# Patient Record
Sex: FEMALE | Race: WHITE | Employment: UNEMPLOYED | URBAN - METROPOLITAN AREA
[De-identification: names, ages, dates, MRNs, and addresses within clinical notes are randomized per-mention and may not be internally consistent; named-entity substitution may affect disease eponyms.]

---

## 2021-06-17 ENCOUNTER — HOSPITAL ENCOUNTER (OUTPATIENT)
Dept: MRI IMAGING | Age: 46
Discharge: HOME | End: 2021-06-17
Payer: COMMERCIAL

## 2021-06-17 VITALS — BODY MASS INDEX: 23.04 KG/M2 | HEIGHT: 63 IN | WEIGHT: 130 LBS

## 2021-06-17 DIAGNOSIS — C50.411 MALIGNANT NEOPLASM OF UPPER-OUTER QUADRANT OF RIGHT FEMALE BREAST, UNSPECIFIED ESTROGEN RECEPTOR STATUS (HCC): ICD-10-CM

## 2021-06-17 PROCEDURE — 77049 MRI BREAST C-+ W/CAD BI: CPT

## 2021-06-17 PROCEDURE — 6360000004 HC RX CONTRAST MEDICATION: Performed by: SURGERY

## 2021-06-17 PROCEDURE — A9579 GAD-BASE MR CONTRAST NOS,1ML: HCPCS | Performed by: SURGERY

## 2021-06-17 RX ADMIN — GADOTERIDOL 12 ML: 279.3 INJECTION, SOLUTION INTRAVENOUS at 14:53

## 2021-06-24 ENCOUNTER — HOSPITAL ENCOUNTER (OUTPATIENT)
Dept: CT IMAGING | Age: 46
Discharge: HOME | End: 2021-06-24
Payer: COMMERCIAL

## 2021-06-24 ENCOUNTER — HOSPITAL ENCOUNTER (OUTPATIENT)
Dept: NUCLEAR MEDICINE | Age: 46
Discharge: HOME | End: 2021-06-24
Payer: COMMERCIAL

## 2021-06-24 DIAGNOSIS — C50.411 MALIGNANT NEOPLASM OF UPPER-OUTER QUADRANT OF RIGHT FEMALE BREAST, UNSPECIFIED ESTROGEN RECEPTOR STATUS (HCC): ICD-10-CM

## 2021-06-24 PROCEDURE — 6360000004 HC RX CONTRAST MEDICATION: Performed by: SURGERY

## 2021-06-24 PROCEDURE — 74177 CT ABD & PELVIS W/CONTRAST: CPT

## 2021-06-24 PROCEDURE — A9503 TC99M MEDRONATE: HCPCS | Performed by: SURGERY

## 2021-06-24 PROCEDURE — 3430000000 HC RX DIAGNOSTIC RADIOPHARMACEUTICAL: Performed by: SURGERY

## 2021-06-24 PROCEDURE — 78306 BONE IMAGING WHOLE BODY: CPT

## 2021-06-24 RX ORDER — TC 99M MEDRONATE 20 MG/10ML
25 INJECTION, POWDER, LYOPHILIZED, FOR SOLUTION INTRAVENOUS
Status: COMPLETED | OUTPATIENT
Start: 2021-06-24 | End: 2021-06-24

## 2021-06-24 RX ADMIN — IOPAMIDOL 80 ML: 755 INJECTION, SOLUTION INTRAVENOUS at 12:33

## 2021-06-24 RX ADMIN — IOHEXOL 50 ML: 240 INJECTION, SOLUTION INTRATHECAL; INTRAVASCULAR; INTRAVENOUS; ORAL at 12:33

## 2021-06-24 RX ADMIN — TC 99M MEDRONATE 25 MILLICURIE: 20 INJECTION, POWDER, LYOPHILIZED, FOR SOLUTION INTRAVENOUS at 12:11

## 2022-06-08 ENCOUNTER — ANESTHESIA EVENT (OUTPATIENT)
Dept: OPERATING ROOM | Age: 47
DRG: 580 | End: 2022-06-08
Payer: COMMERCIAL

## 2022-06-08 RX ORDER — OXYBUTYNIN CHLORIDE 5 MG/1
5 TABLET, EXTENDED RELEASE ORAL EVERY EVENING
COMMUNITY
Start: 2022-04-06

## 2022-06-08 RX ORDER — ANASTROZOLE 1 MG/1
1 TABLET ORAL DAILY
COMMUNITY
Start: 2022-03-09

## 2022-06-08 RX ORDER — ALBUTEROL SULFATE 90 UG/1
2 AEROSOL, METERED RESPIRATORY (INHALATION) PRN
COMMUNITY
Start: 2021-06-30

## 2022-06-08 RX ORDER — DIAZEPAM 5 MG/1
TABLET ORAL PRN
COMMUNITY

## 2022-06-08 RX ORDER — SUMATRIPTAN 50 MG/1
50 TABLET, FILM COATED ORAL PRN
COMMUNITY
Start: 2021-11-22

## 2022-06-08 NOTE — PROGRESS NOTES
6/8 [facility] at Dr Salo Garza, aware no preop physical done and Dr Salo Garza will have to do dos-mp

## 2022-06-08 NOTE — PROGRESS NOTES
Place patient label inside box (if no patient label, complete below)  Name:  :  MR#:   Franklin Aquino / PROCEDURE  1. I (we), Corryguillermina Martinez (Patient Name) authorize Osiris Felix III (Provider / Usama Galeas) and/or such assistants as may be selected by him/her, to perform the following operation/procedure(s): RIGHT BREAST RECONSTRUCTION WITH LATISSIMUS DORSI FLAP AND INSERTION OF TISSUE EXPANDER ; ADJUST VOLUME TISSUE EXPANDER LEFT BREAST. Note: If unable to obtain consent prior to an emergent procedure, document the emergent reason in the medical record. This procedure has been explained to my (our) satisfaction and included in the explanation was:  A) The intended benefit, nature, and extent of the procedure to be performed;  B) The significant risks involved and the probability of success;  C) Alternative procedures and methods of treatment;  D) The dangers and probable consequences of such alternatives (including no procedure or treatment); E) The expected consequences of the procedure on my future health;  F) Whether other qualified individuals would be performing important surgical tasks and/or whether  would be present to advise or support the procedure. I (we) understand that there are other risks of infection and other serious complications in the pre-operative/procedural and postoperative/procedural stages of my (our) care. I (we) have asked all of the questions which I (we) thought were important in deciding whether or not to undergo treatment or diagnosis. These questions have been answered to my (our) satisfaction. I (we) understand that no assurance can be given that the procedure will be a success, and no guarantee or warranty of success has been given to me (us).     2. It has been explained to me (us) that during the course of the operation/procedure, unforeseen conditions may be revealed that necessitate extension of the original procedure(s) or different procedure(s) than those set forth in Paragraph 1. I (we) authorize and request that the above-named physician, his/her assistants or his/her designees, perform procedures as necessary and desirable if deemed to be in my (our) best interest.     Revised 8/2/2021                                                                          Page 1 of 2         3. I acknowledge that health care personnel may be observing this procedure for the purpose of medical education or other specified purposes as may be necessary as requested and/or approved by my (our) physician. 4. I (we) consent to the disposal by the hospital Pathologist of the removed tissue, parts or organs in accordance with hospital policy. 5. I do ____ do not ____ consent to the use of a local infiltration pain blocking agent that will be used by my provider/surgical provider to help alleviate pain during my procedure. 6. I do ____ do not ____ consent to an emergent blood transfusion in the case of a life-threatening situation that requires blood components to be administered. This consent is valid for 24 hours from the beginning of the procedure. 7. This patient does ____ or does not ____ currently have a DNR status/order. If DNR order is in place, obtain Addendum to the Surgical Consent for ALL Patients with a DNR Order to address jelly-operative status for limited intervention or DNR suspension.      8. I have read and fully understand the above Consent for Operation/Procedure and that all blanks were completed before I signed the consent.   _____________________________       _____________________      ____/____am/pm  Signature of Patient or legal representative      Printed Name / Relationship            Date / Time   ____________________________       _____________________      ____/____am/pm  Witness to Signature                                    Printed Name                    Date / Time     If patient is unable to sign or is a minor, complete the following)  Patient is a minor, ____ years of age, or unable to sign because:   ______________________________________________________________________________________________    [facility] If a phone consent is obtained, consent will be documented by using two health care professionals, each affirming that the consenting party has no questions and gives consent for the procedure discussed with the physician/provider.   _____________________          ____________________       _____/_____am/pm   2nd witness to phone consent        Printed name           Date / Time    Informed Consent:  I have provided the explanation described above in section 1 to the patient and/or legal representative.  I have provided the patient and/or legal representative with an opportunity to ask any questions about the proposed operation/procedure.   ___________________________          ____________________         ____/____am/pm  Provider / Proceduralist                            Printed name            Date / Time  Revised 8/2/2021                                                                      Page 2 of 2

## 2022-06-09 ENCOUNTER — ANESTHESIA (OUTPATIENT)
Dept: OPERATING ROOM | Age: 47
DRG: 580 | End: 2022-06-09
Payer: COMMERCIAL

## 2022-06-09 ENCOUNTER — HOSPITAL ENCOUNTER (INPATIENT)
Age: 47
LOS: 2 days | Discharge: HOME | DRG: 580 | End: 2022-06-12
Attending: PLASTIC SURGERY | Admitting: PLASTIC SURGERY
Payer: COMMERCIAL

## 2022-06-09 ENCOUNTER — ANESTHESIA EVENT (OUTPATIENT)
Dept: OPERATING ROOM | Age: 47
DRG: 580 | End: 2022-06-09
Payer: COMMERCIAL

## 2022-06-09 PROBLEM — Z90.13 ABSENCE OF BOTH BREASTS: Status: ACTIVE | Noted: 2022-06-09

## 2022-06-09 PROBLEM — Z98.890: Status: ACTIVE | Noted: 2022-06-09

## 2022-06-09 LAB
ABO/RH: NORMAL
ALBUMIN SERPL-MCNC: 3.3 G/DL (ref 3.4–5)
ANION GAP SERPL CALCULATED.3IONS-SCNC: 11 MMOL/L (ref 3–16)
ANTIBODY SCREEN: NORMAL
BASOPHILS ABSOLUTE: 0 K/UL (ref 0–0.2)
BASOPHILS RELATIVE PERCENT: 0.2 %
BUN BLDV-MCNC: 11 MG/DL (ref 7–20)
CALCIUM SERPL-MCNC: 8.7 MG/DL (ref 8.3–10.6)
CHLORIDE BLD-SCNC: 103 MMOL/L (ref 99–110)
CO2: 21 MMOL/L (ref 21–32)
CREAT SERPL-MCNC: 0.5 MG/DL (ref 0.6–1.1)
EOSINOPHILS ABSOLUTE: 0 K/UL (ref 0–0.6)
EOSINOPHILS RELATIVE PERCENT: 0 %
GFR AFRICAN AMERICAN: >60
GFR NON-AFRICAN AMERICAN: >60
GLUCOSE BLD-MCNC: 162 MG/DL (ref 70–99)
HCT VFR BLD CALC: 24.5 % (ref 36–48)
HCT VFR BLD CALC: 30 % (ref 36–48)
HEMOGLOBIN: 8 G/DL (ref 12–16)
HEMOGLOBIN: 9.8 G/DL (ref 12–16)
LYMPHOCYTES ABSOLUTE: 0.4 K/UL (ref 1–5.1)
LYMPHOCYTES RELATIVE PERCENT: 2.8 %
MCH RBC QN AUTO: 26.4 PG (ref 26–34)
MCH RBC QN AUTO: 26.5 PG (ref 26–34)
MCHC RBC AUTO-ENTMCNC: 32.4 G/DL (ref 31–36)
MCHC RBC AUTO-ENTMCNC: 32.8 G/DL (ref 31–36)
MCV RBC AUTO: 80.8 FL (ref 80–100)
MCV RBC AUTO: 81.5 FL (ref 80–100)
MONOCYTES ABSOLUTE: 0.4 K/UL (ref 0–1.3)
MONOCYTES RELATIVE PERCENT: 3.1 %
NEUTROPHILS ABSOLUTE: 12.3 K/UL (ref 1.7–7.7)
NEUTROPHILS RELATIVE PERCENT: 93.9 %
PDW BLD-RTO: 14.7 % (ref 12.4–15.4)
PDW BLD-RTO: 14.8 % (ref 12.4–15.4)
PHOSPHORUS: 4.8 MG/DL (ref 2.5–4.9)
PLATELET # BLD: 283 K/UL (ref 135–450)
PLATELET # BLD: 292 K/UL (ref 135–450)
PMV BLD AUTO: 6.8 FL (ref 5–10.5)
PMV BLD AUTO: 6.9 FL (ref 5–10.5)
POTASSIUM SERPL-SCNC: 4.1 MMOL/L (ref 3.5–5.1)
PREGNANCY, URINE: NEGATIVE
RBC # BLD: 3.01 M/UL (ref 4–5.2)
RBC # BLD: 3.71 M/UL (ref 4–5.2)
SODIUM BLD-SCNC: 135 MMOL/L (ref 136–145)
WBC # BLD: 11.5 K/UL (ref 4–11)
WBC # BLD: 13.1 K/UL (ref 4–11)

## 2022-06-09 PROCEDURE — 2709999900 HC NON-CHARGEABLE SUPPLY: Performed by: PLASTIC SURGERY

## 2022-06-09 PROCEDURE — 7100000001 HC PACU RECOVERY - ADDTL 15 MIN: Performed by: PLASTIC SURGERY

## 2022-06-09 PROCEDURE — 6360000002 HC RX W HCPCS: Performed by: ANESTHESIOLOGY

## 2022-06-09 PROCEDURE — 2580000003 HC RX 258: Performed by: PLASTIC SURGERY

## 2022-06-09 PROCEDURE — 2580000003 HC RX 258: Performed by: ANESTHESIOLOGY

## 2022-06-09 PROCEDURE — 80069 RENAL FUNCTION PANEL: CPT

## 2022-06-09 PROCEDURE — 85025 COMPLETE CBC W/AUTO DIFF WBC: CPT

## 2022-06-09 PROCEDURE — 2500000003 HC RX 250 WO HCPCS: Performed by: ANESTHESIOLOGY

## 2022-06-09 PROCEDURE — 3700000000 HC ANESTHESIA ATTENDED CARE: Performed by: PLASTIC SURGERY

## 2022-06-09 PROCEDURE — 0HHT0NZ INSERTION OF TISSUE EXPANDER INTO RIGHT BREAST, OPEN APPROACH: ICD-10-PCS | Performed by: PLASTIC SURGERY

## 2022-06-09 PROCEDURE — 6360000002 HC RX W HCPCS: Performed by: INTERNAL MEDICINE

## 2022-06-09 PROCEDURE — 3600000004 HC SURGERY LEVEL 4 BASE: Performed by: PLASTIC SURGERY

## 2022-06-09 PROCEDURE — 2580000003 HC RX 258: Performed by: INTERNAL MEDICINE

## 2022-06-09 PROCEDURE — G0378 HOSPITAL OBSERVATION PER HR: HCPCS

## 2022-06-09 PROCEDURE — 86900 BLOOD TYPING SEROLOGIC ABO: CPT

## 2022-06-09 PROCEDURE — 6370000000 HC RX 637 (ALT 250 FOR IP): Performed by: ANESTHESIOLOGY

## 2022-06-09 PROCEDURE — C2626 INFUSION PUMP, NON-PROG,TEMP: HCPCS | Performed by: PLASTIC SURGERY

## 2022-06-09 PROCEDURE — 2500000003 HC RX 250 WO HCPCS: Performed by: NURSE ANESTHETIST, CERTIFIED REGISTERED

## 2022-06-09 PROCEDURE — 36415 COLL VENOUS BLD VENIPUNCTURE: CPT

## 2022-06-09 PROCEDURE — 2500000003 HC RX 250 WO HCPCS: Performed by: PLASTIC SURGERY

## 2022-06-09 PROCEDURE — 86923 COMPATIBILITY TEST ELECTRIC: CPT

## 2022-06-09 PROCEDURE — C1889 IMPLANT/INSERT DEVICE, NOC: HCPCS | Performed by: PLASTIC SURGERY

## 2022-06-09 PROCEDURE — 6360000002 HC RX W HCPCS: Performed by: PLASTIC SURGERY

## 2022-06-09 PROCEDURE — 86850 RBC ANTIBODY SCREEN: CPT

## 2022-06-09 PROCEDURE — 6360000002 HC RX W HCPCS: Performed by: NURSE ANESTHETIST, CERTIFIED REGISTERED

## 2022-06-09 PROCEDURE — 3600000014 HC SURGERY LEVEL 4 ADDTL 15MIN: Performed by: PLASTIC SURGERY

## 2022-06-09 PROCEDURE — 0KXF0Z5 TRANSFER RIGHT TRUNK MUSCLE, LATISSIMUS DORSI MYOCUTANEOUS FLAP, OPEN APPROACH: ICD-10-PCS | Performed by: PLASTIC SURGERY

## 2022-06-09 PROCEDURE — 84703 CHORIONIC GONADOTROPIN ASSAY: CPT

## 2022-06-09 PROCEDURE — 85027 COMPLETE CBC AUTOMATED: CPT

## 2022-06-09 PROCEDURE — 86901 BLOOD TYPING SEROLOGIC RH(D): CPT

## 2022-06-09 PROCEDURE — 7100000000 HC PACU RECOVERY - FIRST 15 MIN: Performed by: PLASTIC SURGERY

## 2022-06-09 PROCEDURE — 0HWU0NZ REVISION OF TISSUE EXPANDER IN LEFT BREAST, OPEN APPROACH: ICD-10-PCS | Performed by: PLASTIC SURGERY

## 2022-06-09 PROCEDURE — 0W3K0ZZ CONTROL BLEEDING IN UPPER BACK, OPEN APPROACH: ICD-10-PCS | Performed by: PLASTIC SURGERY

## 2022-06-09 PROCEDURE — 3700000001 HC ADD 15 MINUTES (ANESTHESIA): Performed by: PLASTIC SURGERY

## 2022-06-09 RX ORDER — SODIUM CHLORIDE 0.9 % (FLUSH) 0.9 %
5-40 SYRINGE (ML) INJECTION PRN
Status: DISCONTINUED | OUTPATIENT
Start: 2022-06-09 | End: 2022-06-09 | Stop reason: HOSPADM

## 2022-06-09 RX ORDER — ONDANSETRON 2 MG/ML
4 INJECTION INTRAMUSCULAR; INTRAVENOUS
Status: DISCONTINUED | OUTPATIENT
Start: 2022-06-09 | End: 2022-06-09 | Stop reason: HOSPADM

## 2022-06-09 RX ORDER — SODIUM CHLORIDE 9 MG/ML
INJECTION, SOLUTION INTRAVENOUS PRN
Status: DISCONTINUED | OUTPATIENT
Start: 2022-06-09 | End: 2022-06-09 | Stop reason: HOSPADM

## 2022-06-09 RX ORDER — MEPERIDINE HYDROCHLORIDE 25 MG/ML
12.5 INJECTION INTRAMUSCULAR; INTRAVENOUS; SUBCUTANEOUS EVERY 5 MIN PRN
Status: DISCONTINUED | OUTPATIENT
Start: 2022-06-09 | End: 2022-06-12 | Stop reason: HOSPADM

## 2022-06-09 RX ORDER — SODIUM CHLORIDE, SODIUM LACTATE, POTASSIUM CHLORIDE, CALCIUM CHLORIDE 600; 310; 30; 20 MG/100ML; MG/100ML; MG/100ML; MG/100ML
INJECTION, SOLUTION INTRAVENOUS CONTINUOUS
Status: DISCONTINUED | OUTPATIENT
Start: 2022-06-09 | End: 2022-06-09 | Stop reason: HOSPADM

## 2022-06-09 RX ORDER — OXYCODONE HYDROCHLORIDE AND ACETAMINOPHEN 5; 325 MG/1; MG/1
1 TABLET ORAL EVERY 4 HOURS PRN
Status: DISCONTINUED | OUTPATIENT
Start: 2022-06-09 | End: 2022-06-09

## 2022-06-09 RX ORDER — SODIUM CHLORIDE AND POTASSIUM CHLORIDE .9; .15 G/100ML; G/100ML
SOLUTION INTRAVENOUS CONTINUOUS
Status: DISCONTINUED | OUTPATIENT
Start: 2022-06-09 | End: 2022-06-09

## 2022-06-09 RX ORDER — SUCCINYLCHOLINE CHLORIDE 20 MG/ML
INJECTION INTRAMUSCULAR; INTRAVENOUS PRN
Status: DISCONTINUED | OUTPATIENT
Start: 2022-06-09 | End: 2022-06-09 | Stop reason: SDUPTHER

## 2022-06-09 RX ORDER — SODIUM CHLORIDE 0.9 % (FLUSH) 0.9 %
5-40 SYRINGE (ML) INJECTION EVERY 12 HOURS SCHEDULED
Status: DISCONTINUED | OUTPATIENT
Start: 2022-06-09 | End: 2022-06-12 | Stop reason: HOSPADM

## 2022-06-09 RX ORDER — DIPHENHYDRAMINE HYDROCHLORIDE 50 MG/ML
12.5 INJECTION INTRAMUSCULAR; INTRAVENOUS
Status: DISCONTINUED | OUTPATIENT
Start: 2022-06-09 | End: 2022-06-09 | Stop reason: HOSPADM

## 2022-06-09 RX ORDER — SODIUM CHLORIDE 0.9 % (FLUSH) 0.9 %
5-40 SYRINGE (ML) INJECTION PRN
Status: DISCONTINUED | OUTPATIENT
Start: 2022-06-09 | End: 2022-06-12 | Stop reason: HOSPADM

## 2022-06-09 RX ORDER — ONDANSETRON 2 MG/ML
4 INJECTION INTRAMUSCULAR; INTRAVENOUS EVERY 6 HOURS PRN
Status: DISCONTINUED | OUTPATIENT
Start: 2022-06-09 | End: 2022-06-09

## 2022-06-09 RX ORDER — ROCURONIUM BROMIDE 10 MG/ML
INJECTION, SOLUTION INTRAVENOUS PRN
Status: DISCONTINUED | OUTPATIENT
Start: 2022-06-09 | End: 2022-06-09 | Stop reason: SDUPTHER

## 2022-06-09 RX ORDER — FENTANYL CITRATE 50 UG/ML
INJECTION, SOLUTION INTRAMUSCULAR; INTRAVENOUS PRN
Status: DISCONTINUED | OUTPATIENT
Start: 2022-06-09 | End: 2022-06-09

## 2022-06-09 RX ORDER — 0.9 % SODIUM CHLORIDE 0.9 %
1000 INTRAVENOUS SOLUTION INTRAVENOUS ONCE
Status: COMPLETED | OUTPATIENT
Start: 2022-06-09 | End: 2022-06-09

## 2022-06-09 RX ORDER — OXYCODONE HYDROCHLORIDE 5 MG/1
5 TABLET ORAL PRN
Status: ACTIVE | OUTPATIENT
Start: 2022-06-09 | End: 2022-06-09

## 2022-06-09 RX ORDER — PROCHLORPERAZINE EDISYLATE 5 MG/ML
10 INJECTION INTRAMUSCULAR; INTRAVENOUS EVERY 6 HOURS PRN
Status: DISCONTINUED | OUTPATIENT
Start: 2022-06-09 | End: 2022-06-12 | Stop reason: HOSPADM

## 2022-06-09 RX ORDER — PROCHLORPERAZINE EDISYLATE 5 MG/ML
5 INJECTION INTRAMUSCULAR; INTRAVENOUS
Status: DISCONTINUED | OUTPATIENT
Start: 2022-06-09 | End: 2022-06-09 | Stop reason: HOSPADM

## 2022-06-09 RX ORDER — HYDRALAZINE HYDROCHLORIDE 20 MG/ML
10 INJECTION INTRAMUSCULAR; INTRAVENOUS
Status: DISCONTINUED | OUTPATIENT
Start: 2022-06-09 | End: 2022-06-12 | Stop reason: HOSPADM

## 2022-06-09 RX ORDER — ONDANSETRON 2 MG/ML
INJECTION INTRAMUSCULAR; INTRAVENOUS PRN
Status: DISCONTINUED | OUTPATIENT
Start: 2022-06-09 | End: 2022-06-09

## 2022-06-09 RX ORDER — SODIUM CHLORIDE 0.9 % (FLUSH) 0.9 %
5-40 SYRINGE (ML) INJECTION EVERY 12 HOURS SCHEDULED
Status: DISCONTINUED | OUTPATIENT
Start: 2022-06-09 | End: 2022-06-09 | Stop reason: HOSPADM

## 2022-06-09 RX ORDER — SCOLOPAMINE TRANSDERMAL SYSTEM 1 MG/1
1 PATCH, EXTENDED RELEASE TRANSDERMAL
Status: DISCONTINUED | OUTPATIENT
Start: 2022-06-09 | End: 2022-06-12 | Stop reason: HOSPADM

## 2022-06-09 RX ORDER — MIDAZOLAM HYDROCHLORIDE 1 MG/ML
INJECTION INTRAMUSCULAR; INTRAVENOUS PRN
Status: DISCONTINUED | OUTPATIENT
Start: 2022-06-09 | End: 2022-06-09 | Stop reason: SDUPTHER

## 2022-06-09 RX ORDER — MORPHINE SULFATE 10 MG/ML
INJECTION, SOLUTION INTRAMUSCULAR; INTRAVENOUS PRN
Status: DISCONTINUED | OUTPATIENT
Start: 2022-06-09 | End: 2022-06-09

## 2022-06-09 RX ORDER — HYDROMORPHONE HCL 110MG/55ML
PATIENT CONTROLLED ANALGESIA SYRINGE INTRAVENOUS PRN
Status: DISCONTINUED | OUTPATIENT
Start: 2022-06-09 | End: 2022-06-09

## 2022-06-09 RX ORDER — SODIUM CHLORIDE 9 MG/ML
25 INJECTION, SOLUTION INTRAVENOUS PRN
Status: DISCONTINUED | OUTPATIENT
Start: 2022-06-09 | End: 2022-06-12 | Stop reason: HOSPADM

## 2022-06-09 RX ORDER — CLINDAMYCIN PHOSPHATE 900 MG/50ML
INJECTION INTRAVENOUS PRN
Status: DISCONTINUED | OUTPATIENT
Start: 2022-06-09 | End: 2022-06-09 | Stop reason: SDUPTHER

## 2022-06-09 RX ORDER — MEPERIDINE HYDROCHLORIDE 25 MG/ML
12.5 INJECTION INTRAMUSCULAR; INTRAVENOUS; SUBCUTANEOUS PRN
Status: DISCONTINUED | OUTPATIENT
Start: 2022-06-09 | End: 2022-06-09 | Stop reason: HOSPADM

## 2022-06-09 RX ORDER — MORPHINE SULFATE 4 MG/ML
2 INJECTION, SOLUTION INTRAMUSCULAR; INTRAVENOUS EVERY 4 HOURS PRN
Status: DISCONTINUED | OUTPATIENT
Start: 2022-06-09 | End: 2022-06-09

## 2022-06-09 RX ORDER — PROPOFOL 10 MG/ML
INJECTION, EMULSION INTRAVENOUS PRN
Status: DISCONTINUED | OUTPATIENT
Start: 2022-06-09 | End: 2022-06-09 | Stop reason: SDUPTHER

## 2022-06-09 RX ORDER — SODIUM CHLORIDE 9 MG/ML
INJECTION, SOLUTION INTRAVENOUS PRN
Status: DISCONTINUED | OUTPATIENT
Start: 2022-06-09 | End: 2022-06-10

## 2022-06-09 RX ORDER — SODIUM CHLORIDE 9 MG/ML
INJECTION, SOLUTION INTRAVENOUS PRN
Status: DISCONTINUED | OUTPATIENT
Start: 2022-06-09 | End: 2022-06-12 | Stop reason: HOSPADM

## 2022-06-09 RX ORDER — OXYCODONE HYDROCHLORIDE 5 MG/1
10 TABLET ORAL PRN
Status: ACTIVE | OUTPATIENT
Start: 2022-06-09 | End: 2022-06-09

## 2022-06-09 RX ORDER — DIPHENHYDRAMINE HYDROCHLORIDE 50 MG/ML
12.5 INJECTION INTRAMUSCULAR; INTRAVENOUS
Status: ACTIVE | OUTPATIENT
Start: 2022-06-09 | End: 2022-06-09

## 2022-06-09 RX ORDER — 0.9 % SODIUM CHLORIDE 0.9 %
1000 INTRAVENOUS SOLUTION INTRAVENOUS ONCE
Status: DISCONTINUED | OUTPATIENT
Start: 2022-06-09 | End: 2022-06-12 | Stop reason: HOSPADM

## 2022-06-09 RX ORDER — METOCLOPRAMIDE HYDROCHLORIDE 5 MG/ML
10 INJECTION INTRAMUSCULAR; INTRAVENOUS
Status: ACTIVE | OUTPATIENT
Start: 2022-06-09 | End: 2022-06-09

## 2022-06-09 RX ORDER — HYDRALAZINE HYDROCHLORIDE 20 MG/ML
10 INJECTION INTRAMUSCULAR; INTRAVENOUS
Status: DISCONTINUED | OUTPATIENT
Start: 2022-06-09 | End: 2022-06-09 | Stop reason: HOSPADM

## 2022-06-09 RX ORDER — LABETALOL HYDROCHLORIDE 5 MG/ML
10 INJECTION, SOLUTION INTRAVENOUS
Status: DISCONTINUED | OUTPATIENT
Start: 2022-06-09 | End: 2022-06-12 | Stop reason: HOSPADM

## 2022-06-09 RX ORDER — SODIUM CHLORIDE 9 MG/ML
INJECTION, SOLUTION INTRAVENOUS CONTINUOUS PRN
Status: DISCONTINUED | OUTPATIENT
Start: 2022-06-09 | End: 2022-06-09 | Stop reason: SDUPTHER

## 2022-06-09 RX ORDER — LIDOCAINE HYDROCHLORIDE 20 MG/ML
INJECTION, SOLUTION INTRAVENOUS PRN
Status: DISCONTINUED | OUTPATIENT
Start: 2022-06-09 | End: 2022-06-09 | Stop reason: SDUPTHER

## 2022-06-09 RX ORDER — ONDANSETRON 4 MG/1
4 TABLET, ORALLY DISINTEGRATING ORAL EVERY 8 HOURS PRN
Status: DISCONTINUED | OUTPATIENT
Start: 2022-06-09 | End: 2022-06-09

## 2022-06-09 RX ORDER — DEXAMETHASONE SODIUM PHOSPHATE 4 MG/ML
INJECTION, SOLUTION INTRA-ARTICULAR; INTRALESIONAL; INTRAMUSCULAR; INTRAVENOUS; SOFT TISSUE PRN
Status: DISCONTINUED | OUTPATIENT
Start: 2022-06-09 | End: 2022-06-09 | Stop reason: SDUPTHER

## 2022-06-09 RX ORDER — APREPITANT 40 MG/1
40 CAPSULE ORAL ONCE
Status: COMPLETED | OUTPATIENT
Start: 2022-06-09 | End: 2022-06-09

## 2022-06-09 RX ORDER — CLINDAMYCIN PHOSPHATE 900 MG/50ML
900 INJECTION INTRAVENOUS EVERY 8 HOURS
Status: DISCONTINUED | OUTPATIENT
Start: 2022-06-09 | End: 2022-06-09 | Stop reason: HOSPADM

## 2022-06-09 RX ADMIN — SODIUM CHLORIDE: 9 INJECTION, SOLUTION INTRAVENOUS at 22:04

## 2022-06-09 RX ADMIN — HYDROMORPHONE HYDROCHLORIDE 0.5 MG: 2 INJECTION, SOLUTION INTRAMUSCULAR; INTRAVENOUS; SUBCUTANEOUS at 16:27

## 2022-06-09 RX ADMIN — HYDROMORPHONE HYDROCHLORIDE 0.5 MG: 2 INJECTION, SOLUTION INTRAMUSCULAR; INTRAVENOUS; SUBCUTANEOUS at 16:12

## 2022-06-09 RX ADMIN — CLINDAMYCIN PHOSPHATE 900 MG: 900 INJECTION, SOLUTION INTRAVENOUS at 12:22

## 2022-06-09 RX ADMIN — APREPITANT 40 MG: 40 CAPSULE ORAL at 12:15

## 2022-06-09 RX ADMIN — LIDOCAINE HYDROCHLORIDE 100 MG: 20 INJECTION, SOLUTION INTRAVENOUS at 12:30

## 2022-06-09 RX ADMIN — ROCURONIUM BROMIDE 30 MG: 10 INJECTION INTRAVENOUS at 14:14

## 2022-06-09 RX ADMIN — MIDAZOLAM HYDROCHLORIDE 1 MG: 2 INJECTION, SOLUTION INTRAMUSCULAR; INTRAVENOUS at 12:23

## 2022-06-09 RX ADMIN — SODIUM CHLORIDE, POTASSIUM CHLORIDE, SODIUM LACTATE AND CALCIUM CHLORIDE: 600; 310; 30; 20 INJECTION, SOLUTION INTRAVENOUS at 13:50

## 2022-06-09 RX ADMIN — SODIUM CHLORIDE: 9 INJECTION, SOLUTION INTRAVENOUS at 21:51

## 2022-06-09 RX ADMIN — DEXAMETHASONE SODIUM PHOSPHATE 4 MG: 4 INJECTION, SOLUTION INTRAMUSCULAR; INTRAVENOUS at 14:20

## 2022-06-09 RX ADMIN — SODIUM CHLORIDE, PRESERVATIVE FREE 10 ML: 5 INJECTION INTRAVENOUS at 19:51

## 2022-06-09 RX ADMIN — MORPHINE SULFATE 2 MG: 10 INJECTION, SOLUTION INTRAMUSCULAR; INTRAVENOUS at 15:36

## 2022-06-09 RX ADMIN — CLINDAMYCIN PHOSPHATE 900 MG: 18 INJECTION, SOLUTION INTRAVENOUS at 22:14

## 2022-06-09 RX ADMIN — PROPOFOL 180 MG: 10 INJECTION, EMULSION INTRAVENOUS at 12:30

## 2022-06-09 RX ADMIN — MIDAZOLAM HYDROCHLORIDE 1 MG: 2 INJECTION, SOLUTION INTRAMUSCULAR; INTRAVENOUS at 12:22

## 2022-06-09 RX ADMIN — FENTANYL CITRATE 50 MCG: 50 INJECTION, SOLUTION INTRAMUSCULAR; INTRAVENOUS at 12:24

## 2022-06-09 RX ADMIN — POTASSIUM CHLORIDE AND SODIUM CHLORIDE: 900; 150 INJECTION, SOLUTION INTRAVENOUS at 19:57

## 2022-06-09 RX ADMIN — PROCHLORPERAZINE EDISYLATE 10 MG: 5 INJECTION, SOLUTION INTRAMUSCULAR; INTRAVENOUS at 19:50

## 2022-06-09 RX ADMIN — SODIUM CHLORIDE: 9 INJECTION, SOLUTION INTRAVENOUS at 23:09

## 2022-06-09 RX ADMIN — ROCURONIUM BROMIDE 50 MG: 10 INJECTION INTRAVENOUS at 13:09

## 2022-06-09 RX ADMIN — ROCURONIUM BROMIDE 50 MG: 10 INJECTION INTRAVENOUS at 12:30

## 2022-06-09 RX ADMIN — ONDANSETRON 4 MG: 2 INJECTION INTRAMUSCULAR; INTRAVENOUS at 16:51

## 2022-06-09 RX ADMIN — HYDROMORPHONE HYDROCHLORIDE 0.5 MG: 1 INJECTION, SOLUTION INTRAMUSCULAR; INTRAVENOUS; SUBCUTANEOUS at 17:42

## 2022-06-09 RX ADMIN — PHENYLEPHRINE HYDROCHLORIDE 200 MCG: 10 INJECTION, SOLUTION INTRAMUSCULAR; INTRAVENOUS; SUBCUTANEOUS at 22:13

## 2022-06-09 RX ADMIN — MEPERIDINE HYDROCHLORIDE 12.5 MG: 25 INJECTION INTRAMUSCULAR; INTRAVENOUS; SUBCUTANEOUS at 23:17

## 2022-06-09 RX ADMIN — FENTANYL CITRATE 50 MCG: 50 INJECTION, SOLUTION INTRAMUSCULAR; INTRAVENOUS at 12:22

## 2022-06-09 RX ADMIN — PROPOFOL 150 MG: 10 INJECTION, EMULSION INTRAVENOUS at 21:55

## 2022-06-09 RX ADMIN — PROPOFOL 20 MG: 10 INJECTION, EMULSION INTRAVENOUS at 16:00

## 2022-06-09 RX ADMIN — SODIUM CHLORIDE 1000 ML: 9 INJECTION, SOLUTION INTRAVENOUS at 20:37

## 2022-06-09 RX ADMIN — FENTANYL CITRATE 100 MCG: 50 INJECTION, SOLUTION INTRAMUSCULAR; INTRAVENOUS at 21:52

## 2022-06-09 RX ADMIN — HYDROMORPHONE HYDROCHLORIDE 0.5 MG: 1 INJECTION, SOLUTION INTRAMUSCULAR; INTRAVENOUS; SUBCUTANEOUS at 17:51

## 2022-06-09 RX ADMIN — MORPHINE SULFATE 4 MG: 10 INJECTION, SOLUTION INTRAMUSCULAR; INTRAVENOUS at 13:12

## 2022-06-09 RX ADMIN — ONDANSETRON 4 MG: 2 INJECTION INTRAMUSCULAR; INTRAVENOUS at 20:54

## 2022-06-09 RX ADMIN — SUCCINYLCHOLINE CHLORIDE 60 MG: 20 INJECTION, SOLUTION INTRAMUSCULAR; INTRAVENOUS; PARENTERAL at 21:55

## 2022-06-09 RX ADMIN — SODIUM CHLORIDE, POTASSIUM CHLORIDE, SODIUM LACTATE AND CALCIUM CHLORIDE: 600; 310; 30; 20 INJECTION, SOLUTION INTRAVENOUS at 11:51

## 2022-06-09 RX ADMIN — MORPHINE SULFATE 2 MG: 10 INJECTION, SOLUTION INTRAMUSCULAR; INTRAVENOUS at 13:02

## 2022-06-09 RX ADMIN — PHENYLEPHRINE HYDROCHLORIDE 200 MCG: 10 INJECTION, SOLUTION INTRAMUSCULAR; INTRAVENOUS; SUBCUTANEOUS at 22:37

## 2022-06-09 RX ADMIN — MORPHINE SULFATE 2 MG: 10 INJECTION, SOLUTION INTRAMUSCULAR; INTRAVENOUS at 15:40

## 2022-06-09 RX ADMIN — SODIUM CHLORIDE, POTASSIUM CHLORIDE, SODIUM LACTATE AND CALCIUM CHLORIDE: 600; 310; 30; 20 INJECTION, SOLUTION INTRAVENOUS at 16:09

## 2022-06-09 ASSESSMENT — PAIN SCALES - GENERAL
PAINLEVEL_OUTOF10: 0
PAINLEVEL_OUTOF10: 3
PAINLEVEL_OUTOF10: 7
PAINLEVEL_OUTOF10: 0
PAINLEVEL_OUTOF10: 0
PAINLEVEL_OUTOF10: 7

## 2022-06-09 ASSESSMENT — PAIN DESCRIPTION - FREQUENCY
FREQUENCY: CONTINUOUS
FREQUENCY: CONTINUOUS

## 2022-06-09 ASSESSMENT — PAIN DESCRIPTION - PAIN TYPE
TYPE: SURGICAL PAIN
TYPE: SURGICAL PAIN

## 2022-06-09 ASSESSMENT — LIFESTYLE VARIABLES: SMOKING_STATUS: 0

## 2022-06-09 ASSESSMENT — PAIN DESCRIPTION - ONSET
ONSET: ON-GOING
ONSET: ON-GOING

## 2022-06-09 ASSESSMENT — PAIN DESCRIPTION - DESCRIPTORS
DESCRIPTORS: ACHING
DESCRIPTORS: DISCOMFORT
DESCRIPTORS: ACHING

## 2022-06-09 ASSESSMENT — PAIN DESCRIPTION - ORIENTATION
ORIENTATION: RIGHT;LEFT
ORIENTATION: RIGHT;LEFT

## 2022-06-09 ASSESSMENT — PAIN DESCRIPTION - LOCATION
LOCATION: BREAST
LOCATION: BREAST

## 2022-06-09 ASSESSMENT — PAIN - FUNCTIONAL ASSESSMENT
PAIN_FUNCTIONAL_ASSESSMENT: 0-10
PAIN_FUNCTIONAL_ASSESSMENT: PREVENTS OR INTERFERES SOME ACTIVE ACTIVITIES AND ADLS

## 2022-06-09 NOTE — BRIEF OP NOTE
Brief Postoperative Note      Patient: Marco Antonio Galeas  YOB: 1975  MRN: [id number]    Date of Procedure: 6/9/2022    Pre-Op Diagnosis: Malignant neoplasm of right female breast, unspecified estrogen receptor status, unspecified site of breast ([facility] [C50.911]    Post-Op Diagnosis: Same       Procedure(s):  RIGHT BREAST RECONSTRUCTION WITH LATISSIMUS DORSI FLAP AND INSERTION OF TISSUE EXPANDER  .     Surgeon(s):  Yasmine Monk MD    Assistant:  Surgical Assistant: Ashley Willingham RN; Alda Torres RN    Anesthesia: General    Estimated Blood Loss (mL): less than 278     Complications: None    Specimens:   * No specimens in log *    Implants:  * No implants in log *      Drains:   Closed/Suction Drain Right Back Bulb (Active)   Site Description Clean, dry & intact 06/09/22 1513   Dressing Status New dressing applied 06/09/22 1513   Drain Status Compressed 06/09/22 1513       Closed/Suction Drain Lateral RUQ Bulb (Active)       Urinary Catheter 2 Way (Active)       Findings: n/a    Electronically signed by Yasmine Monk MD on 6/9/2022 at 5:01 PM

## 2022-06-09 NOTE — PROGRESS NOTES
Patient arrived to PACU post RIGHT BREAST RECONSTRUCTION WITH LATISSIMUS DORSI FLAP AND INSERTION OF TISSUE EXPANDER - Right with Dr. Rolando Fernandez. VSS on arrival. CRNA gave PACU RN report at bedside stating no complications during procedure. Surgical sites dry and intact. Patient shows no signs of pain at this time. Will continue to monitor.

## 2022-06-09 NOTE — ANESTHESIA PRE PROCEDURE
Department of Anesthesiology  Preprocedure Note       Name:  Rickie Healy   Age:  48 y.o.  :  1975                                          MRN:  [id number]         Date:  2022      Surgeon: Manuela Anderson):  Cherelle Angela MD    Procedure: Procedure(s):  RIGHT BREAST RECONSTRUCTION WITH LATISSIMUS DORSI FLAP AND INSERTION OF TISSUE EXPANDER  . Medications prior to admission:   Prior to Admission medications    Medication Sig Start Date End Date Taking? Authorizing Provider   oxybutynin (DITROPAN-XL) 5 MG extended release tablet Take 5 mg by mouth every evening 22  Yes Historical Provider, MD   SUMAtriptan (IMITREX) 50 MG tablet Take 50 mg by mouth as needed 21  Yes Historical Provider, MD   anastrozole (ARIMIDEX) 1 MG tablet Take 1 mg by mouth daily 3/9/22  Yes Historical Provider, MD   albuterol sulfate HFA (PROVENTIL;VENTOLIN;PROAIR) 108 (90 Base) MCG/ACT inhaler Inhale 2 puffs into the lungs as needed 21  Yes Historical Provider, MD   diazePAM (VALIUM) 5 MG tablet Take by mouth as needed. Historical Provider, MD       Current medications:    Current Facility-Administered Medications   Medication Dose Route Frequency Provider Last Rate Last Admin    lactated ringers infusion   IntraVENous Continuous Corinna Stevenson  mL/hr at 22 1200 NoRateChange at 22 1200    sodium chloride flush 0.9 % injection 5-40 mL  5-40 mL IntraVENous 2 times per day Corinna Stevenson MD        sodium chloride flush 0.9 % injection 5-40 mL  5-40 mL IntraVENous PRN Corinna Stevenson MD        0.9 % sodium chloride infusion   IntraVENous PRN Corinna Stevenson MD        clindamycin (CLEOCIN) 900 mg in dextrose 5 % 50 mL IVPB  900 mg IntraVENous 9500 Germán Blackburn III, MD           Allergies: Allergies   Allergen Reactions    Penicillins Hives and Rash       Problem List:  There is no problem list on file for this patient.       Past Medical History:        Diagnosis Date    Asthma     Cancer ([facility]     RENAY BREAST    History of radiation therapy        Past Surgical History:        Procedure Laterality Date    ABDOMEN SURGERY      \"tummy tuck\"     SECTION      MASTECTOMY, BILATERAL         Social History:    Social History     Tobacco Use    Smoking status: Never Smoker    Smokeless tobacco: Never Used   Substance Use Topics    Alcohol use: Yes                                Counseling given: Not Answered      Vital Signs (Current):   Vitals:    22 0838 22 1137   BP:  (!) 152/95   Pulse:  78   Resp:  13   Temp:  98.2 °F (36.8 °C)   TempSrc:  Temporal   SpO2:  97%   Weight: 130 lb (59 kg) 143 lb 3.2 oz (65 kg)   Height: 5' 3\" (1.6 m) 5' 3\" (1.6 m)                                              BP Readings from Last 3 Encounters:   22 (!) 152/95       NPO Status: Time of last liquid consumption: 1800                        Time of last solid consumption: 1800                        Date of last liquid consumption: 22                        Date of last solid food consumption: 22    BMI:   Wt Readings from Last 3 Encounters:   22 143 lb 3.2 oz (65 kg)   21 130 lb (59 kg)     Body mass index is 25.37 kg/m². CBC: No results found for: WBC, RBC, HGB, HCT, MCV, RDW, PLT    CMP: No results found for: NA, K, CL, CO2, BUN, CREATININE, GFRAA, AGRATIO, LABGLOM, GLUCOSE, GLU, PROT, CALCIUM, BILITOT, ALKPHOS, AST, ALT    POC Tests: No results for input(s): POCGLU, POCNA, POCK, POCCL, POCBUN, POCHEMO, POCHCT in the last 72 hours.     Coags: No results found for: PROTIME, INR, APTT    HCG (If Applicable):   Lab Results   Component Value Date    PREGTESTUR Negative 2022        ABGs: No results found for: PHART, PO2ART, QVQ9FQG, ORU9GML, BEART, E8WOPRPW     Type & Screen (If Applicable):  No results found for: LABABO, LABRH    Drug/Infectious Status (If Applicable):  No results found for: HIV, HEPCAB    COVID-19 Screening (If Applicable): No results found for: COVID19        Anesthesia Evaluation  Patient summary reviewed and Nursing notes reviewed no history of anesthetic complications:   Airway: Mallampati: II  TM distance: >3 FB   Neck ROM: full  Mouth opening: > = 3 FB   Dental: normal exam         Pulmonary: breath sounds clear to auscultation  (+) asthma:     (-) not a current smoker (never)                          ROS comment: covid 12/2020    Mild case    Cardiovascular:  Exercise tolerance: good (>4 METS),       (-) past MI    NYHA Classification: I    Rhythm: regular  Rate: normal           Beta Blocker:  Not on Beta Blocker         Neuro/Psych:               GI/Hepatic/Renal:        (-) GERD       Endo/Other: Negative Endo/Other ROS   (+) malignancy/cancer (mastectomy 8/2021    radiation tx only  ). Abdominal:             Vascular: Other Findings:           Anesthesia Plan      general     ASA 3       Induction: intravenous. MIPS: Prophylactic antiemetics administered. Anesthetic plan and risks discussed with patient and spouse. Plan discussed with CRNA.     Attending anesthesiologist reviewed and agrees with Preprocedure content                Maria E Almaguer DO   6/9/2022

## 2022-06-09 NOTE — H&P
[address] Same Day Surgery Update H & P  Department of General Surgery   Surgical Service   Pre-operative History and Physical  Last H & P within the last 30 days. DIAGNOSIS:   Malignant neoplasm of right female breast, unspecified estrogen receptor status, unspecified site of breast ([facility] [C50.911]    Procedure(s):  RIGHT BREAST RECONSTRUCTION WITH LATISSIMUS DORSI FLAP AND INSERTION OF TISSUE EXPANDER  . History obtained from: Patient interview and EHR      HISTORY OF PRESENT ILLNESS:   The patient is a [address] y.o. female with history of breast cancer presents today for the above procedure. Illness Screening: Patient denies fever, chills, worsening cough, or close contact with sick individuals. Past Medical History:        Diagnosis Date    Asthma     [facility]     RENAY BREAST    History of radiation therapy      Past Surgical History:        Procedure Laterality Date     SECTION      MASTECTOMY, BILATERAL         Medications Prior to Admission:      Prior to Admission medications    Medication Sig Start Date End Date Taking? Authorizing Provider   oxybutynin (DITROPAN-XL) 5 MG extended release tablet Take 5 mg by mouth every evening 22  Yes Historical Provider, MD   SUMAtriptan (IMITREX) 50 MG tablet Take 50 mg by mouth as needed 21  Yes Historical Provider, MD   anastrozole (ARIMIDEX) 1 MG tablet Take 1 mg by mouth daily 3/9/22  Yes Historical Provider, MD   albuterol sulfate HFA (PROVENTIL;VENTOLIN;PROAIR) 108 (90 Base) MCG/ACT inhaler Inhale 2 puffs into the lungs as needed 21  Yes Historical Provider, MD   diazePAM (VALIUM) 5 MG tablet Take by mouth as needed.     Historical Provider, MD         Allergies:  Penicillins    PHYSICAL EXAM:      BP (!) 152/95   Pulse 78   Temp 98.2 °F (36.8 °C) (Temporal)   Resp 13   Ht 5' 3\" (1.6 m)   Wt 143 lb 3.2 oz (65 kg)   SpO2 97%   BMI 25.37 kg/m²      Airway:  Airway patent with no audible stridor    Heart:  Regular rate and rhythm, No murmur noted    Lungs:  No increased work of breathing, good air exchange, clear to auscultation bilaterally, no crackles or wheezing    Abdomen:  Soft, non-distended, non-tender, no rebound tenderness or guarding, and no masses palpated    ASSESSMENT AND PLAN     Patient is a 48 y.o. female with above specified procedure planned. 1.  The patients history and physical was obtained and signed off by the pre-admission testing department. Patient seen and focused exam done today- no new changes since last physical exam on 6/8/22    2. Access to ancillary services are available per request of the provider. 3.  Of note the patient has c/o left tenderness and deformity of her expander. She will discuss that with Dr. Anselmo Steele.       Ana Lopez, NIKOLAY - CNP     6/9/2022

## 2022-06-09 NOTE — PROGRESS NOTES
Patient seen at the bedside per request by Dr. Rolando Fernandez  Has returned from PACU around 6 PM, status post right breast reconstruction with latissimus dorsal flap and insertion of tissue expander  Patient has been complaining of nausea and dizziness. Has received a Zofran with no effect. Likely secondary to anesthetics. Will add Compazine IV as needed and monitor  Will reassess in few hours  Discussed with RN to call back with any concerns    Patient seen at bedside around 8:30 PM as notified by RN that patient's back is completely saturated with blood. Both drains saturated and soft tissue swelling at right shoulder blade  Systolic blood pressure has dropped from 105 (1 hour ago) to mid 70s, heart rate in mid 90s. Patient is alert awake and oriented, conversing.   Patient has 2 peripheral IVs on left arm    Dr. Rolando Fernandez informed by patient's RN  Stat CBC and type and screen ordered  Normal saline 1 L wide open, followed by 250 cc/h  General surgery resident contacted stat  Keep n.p.o.

## 2022-06-09 NOTE — PROGRESS NOTES
PACU Transfer to Floor Note    Procedure(s):  RIGHT BREAST RECONSTRUCTION WITH LATISSIMUS DORSI FLAP AND INSERTION OF TISSUE EXPANDER  . Current Allergies: Penicillins    Pt meets criteria as per Luis M Score and ASPAN Standards to transfer to next phase of care. No results for input(s): POCGLU in the last 72 hours. Vitals:    06/09/22 1815   BP: 131/70   Pulse: 96   Resp: 22   Temp: 97.7 °F (36.5 °C)   SpO2: 100%     Vitals within 20% of pt's admission vitals as per LUIS M SCORE    SpO2: 100 %    O2 Flow Rate (L/min): 3 L/min      Intake/Output Summary (Last 24 hours) at 6/9/2022 1827  Last data filed at 6/9/2022 1800  Gross per 24 hour   Intake 2097.4 ml   Output 215 ml   Net 1882.4 ml       Pain assessment:  present - adequately treated    Pain Level: 3 (tolerable)    Patient was assessed for alterations to skin integrity. There were not alterations observed. Is patient incontinent: no, Vann removed. PACU called and updated patient's family. Patient has all belongings at discharge from PACU. Handoff report given at bedside.    Family updated and directed to pt room 5307.       6/9/2022 6:27 PM

## 2022-06-10 PROBLEM — N64.89 HEMATOMA OF RIGHT BREAST: Status: ACTIVE | Noted: 2022-06-10

## 2022-06-10 LAB
HCT VFR BLD CALC: 23.8 % (ref 36–48)
HEMOGLOBIN: 8.1 G/DL (ref 12–16)
MCH RBC QN AUTO: 27 PG (ref 26–34)
MCHC RBC AUTO-ENTMCNC: 33.8 G/DL (ref 31–36)
MCV RBC AUTO: 80 FL (ref 80–100)
PDW BLD-RTO: 14.7 % (ref 12.4–15.4)
PLATELET # BLD: 269 K/UL (ref 135–450)
PMV BLD AUTO: 7.4 FL (ref 5–10.5)
RBC # BLD: 2.98 M/UL (ref 4–5.2)
WBC # BLD: 9.4 K/UL (ref 4–11)

## 2022-06-10 PROCEDURE — 6360000002 HC RX W HCPCS: Performed by: PLASTIC SURGERY

## 2022-06-10 PROCEDURE — 36415 COLL VENOUS BLD VENIPUNCTURE: CPT

## 2022-06-10 PROCEDURE — 2580000003 HC RX 258: Performed by: PLASTIC SURGERY

## 2022-06-10 PROCEDURE — 1200000000 HC SEMI PRIVATE

## 2022-06-10 PROCEDURE — 85027 COMPLETE CBC AUTOMATED: CPT

## 2022-06-10 PROCEDURE — 2580000003 HC RX 258: Performed by: INTERNAL MEDICINE

## 2022-06-10 PROCEDURE — 6370000000 HC RX 637 (ALT 250 FOR IP): Performed by: PLASTIC SURGERY

## 2022-06-10 RX ORDER — 0.9 % SODIUM CHLORIDE 0.9 %
1000 INTRAVENOUS SOLUTION INTRAVENOUS ONCE
Status: COMPLETED | OUTPATIENT
Start: 2022-06-10 | End: 2022-06-10

## 2022-06-10 RX ORDER — ANASTROZOLE 1 MG/1
1 TABLET ORAL DAILY
Status: DISCONTINUED | OUTPATIENT
Start: 2022-06-10 | End: 2022-06-12 | Stop reason: HOSPADM

## 2022-06-10 RX ORDER — ONDANSETRON 4 MG/1
4 TABLET, ORALLY DISINTEGRATING ORAL EVERY 8 HOURS PRN
Status: DISCONTINUED | OUTPATIENT
Start: 2022-06-10 | End: 2022-06-12 | Stop reason: HOSPADM

## 2022-06-10 RX ORDER — ONDANSETRON 2 MG/ML
4 INJECTION INTRAMUSCULAR; INTRAVENOUS EVERY 6 HOURS PRN
Status: DISCONTINUED | OUTPATIENT
Start: 2022-06-10 | End: 2022-06-12 | Stop reason: HOSPADM

## 2022-06-10 RX ORDER — SODIUM CHLORIDE AND POTASSIUM CHLORIDE .9; .15 G/100ML; G/100ML
SOLUTION INTRAVENOUS CONTINUOUS
Status: DISCONTINUED | OUTPATIENT
Start: 2022-06-10 | End: 2022-06-12 | Stop reason: HOSPADM

## 2022-06-10 RX ORDER — SODIUM CHLORIDE 0.9 % (FLUSH) 0.9 %
5-40 SYRINGE (ML) INJECTION PRN
Status: DISCONTINUED | OUTPATIENT
Start: 2022-06-10 | End: 2022-06-12 | Stop reason: HOSPADM

## 2022-06-10 RX ORDER — OXYCODONE HYDROCHLORIDE 5 MG/1
5 TABLET ORAL EVERY 4 HOURS PRN
Status: DISCONTINUED | OUTPATIENT
Start: 2022-06-10 | End: 2022-06-12 | Stop reason: HOSPADM

## 2022-06-10 RX ORDER — SODIUM CHLORIDE 9 MG/ML
INJECTION, SOLUTION INTRAVENOUS PRN
Status: DISCONTINUED | OUTPATIENT
Start: 2022-06-10 | End: 2022-06-12 | Stop reason: HOSPADM

## 2022-06-10 RX ORDER — SODIUM CHLORIDE 0.9 % (FLUSH) 0.9 %
5-40 SYRINGE (ML) INJECTION EVERY 12 HOURS SCHEDULED
Status: DISCONTINUED | OUTPATIENT
Start: 2022-06-10 | End: 2022-06-12 | Stop reason: HOSPADM

## 2022-06-10 RX ORDER — MORPHINE SULFATE 2 MG/ML
2 INJECTION, SOLUTION INTRAMUSCULAR; INTRAVENOUS
Status: DISCONTINUED | OUTPATIENT
Start: 2022-06-10 | End: 2022-06-12 | Stop reason: HOSPADM

## 2022-06-10 RX ADMIN — ANASTROZOLE 1 MG: 1 TABLET, COATED ORAL at 09:15

## 2022-06-10 RX ADMIN — OXYCODONE 5 MG: 5 TABLET ORAL at 20:00

## 2022-06-10 RX ADMIN — POTASSIUM CHLORIDE AND SODIUM CHLORIDE: 900; 150 INJECTION, SOLUTION INTRAVENOUS at 01:00

## 2022-06-10 RX ADMIN — SODIUM CHLORIDE, PRESERVATIVE FREE 10 ML: 5 INJECTION INTRAVENOUS at 20:01

## 2022-06-10 RX ADMIN — OXYCODONE 5 MG: 5 TABLET ORAL at 09:15

## 2022-06-10 RX ADMIN — POTASSIUM CHLORIDE AND SODIUM CHLORIDE: 900; 150 INJECTION, SOLUTION INTRAVENOUS at 16:05

## 2022-06-10 RX ADMIN — SODIUM CHLORIDE, PRESERVATIVE FREE 5 ML: 5 INJECTION INTRAVENOUS at 07:07

## 2022-06-10 RX ADMIN — SODIUM CHLORIDE 1000 ML: 9 INJECTION, SOLUTION INTRAVENOUS at 03:14

## 2022-06-10 RX ADMIN — OXYCODONE 5 MG: 5 TABLET ORAL at 14:30

## 2022-06-10 RX ADMIN — CEFAZOLIN SODIUM 1000 MG: 1 INJECTION, POWDER, FOR SOLUTION INTRAMUSCULAR; INTRAVENOUS at 03:11

## 2022-06-10 ASSESSMENT — PAIN SCALES - GENERAL
PAINLEVEL_OUTOF10: 7
PAINLEVEL_OUTOF10: 6
PAINLEVEL_OUTOF10: 4
PAINLEVEL_OUTOF10: 6

## 2022-06-10 ASSESSMENT — PAIN DESCRIPTION - DESCRIPTORS
DESCRIPTORS: ACHING
DESCRIPTORS: SORE
DESCRIPTORS: ACHING

## 2022-06-10 ASSESSMENT — PAIN DESCRIPTION - ORIENTATION
ORIENTATION: RIGHT
ORIENTATION: RIGHT
ORIENTATION: RIGHT;UPPER

## 2022-06-10 ASSESSMENT — PAIN DESCRIPTION - LOCATION
LOCATION: ARM;CHEST
LOCATION: ARM;CHEST
LOCATION: CHEST;BACK

## 2022-06-10 ASSESSMENT — PAIN DESCRIPTION - FREQUENCY: FREQUENCY: CONTINUOUS

## 2022-06-10 ASSESSMENT — PAIN DESCRIPTION - PAIN TYPE: TYPE: SURGICAL PAIN

## 2022-06-10 ASSESSMENT — PAIN DESCRIPTION - ONSET: ONSET: ON-GOING

## 2022-06-10 ASSESSMENT — PAIN - FUNCTIONAL ASSESSMENT: PAIN_FUNCTIONAL_ASSESSMENT: PREVENTS OR INTERFERES SOME ACTIVE ACTIVITIES AND ADLS

## 2022-06-10 NOTE — ANESTHESIA PRE PROCEDURE
Department of Anesthesiology  Preprocedure Note       Name:  Angelito Alberto   Age:  48 y.o.  :  1975                                          MRN:  [id number]         Date:  2022      Surgeon: Olga Metcalf):  Kristen Schwab MD    Procedure: Procedure(s):  EXPLOATION OF RIGHT BACK WOUND WITH EVACUATION OF HEMATOMA    Medications prior to admission:   Prior to Admission medications    Medication Sig Start Date End Date Taking? Authorizing Provider   oxybutynin (DITROPAN-XL) 5 MG extended release tablet Take 5 mg by mouth every evening 22  Yes Historical Provider, MD   SUMAtriptan (IMITREX) 50 MG tablet Take 50 mg by mouth as needed 21  Yes Historical Provider, MD   anastrozole (ARIMIDEX) 1 MG tablet Take 1 mg by mouth daily 3/9/22  Yes Historical Provider, MD   albuterol sulfate HFA (PROVENTIL;VENTOLIN;PROAIR) 108 (90 Base) MCG/ACT inhaler Inhale 2 puffs into the lungs as needed 21  Yes Historical Provider, MD   diazePAM (VALIUM) 5 MG tablet Take by mouth as needed.     Historical Provider, MD       Current medications:    Current Facility-Administered Medications   Medication Dose Route Frequency Provider Last Rate Last Admin    scopolamine (TRANSDERM-SCOP) transdermal patch 1 patch  1 patch TransDERmal Q72H Nathan Pena DO   1 patch at 22 1215    sodium chloride flush 0.9 % injection 5-40 mL  5-40 mL IntraVENous 2 times per day Gabriel Rice III, MD   10 mL at 22 195    sodium chloride flush 0.9 % injection 5-40 mL  5-40 mL IntraVENous PRN Gabriel Rice III, MD        0.9 % sodium chloride infusion   IntraVENous PRN Gabriel Rice III, MD        ondansetron (ZOFRAN-ODT) disintegrating tablet 4 mg  4 mg Oral Q8H PRN Gabriel Rice III, MD        Or    ondansetron [facility]) injection 4 mg  4 mg IntraVENous Q6H PRN Gabriel Rice III, MD   4 mg at 22    0.9% NaCl with KCl 20 mEq infusion   IntraVENous Continuous Sandra Schmidt Mauri Coffman  mL/hr at 22 New Bag at 22    ceFAZolin (ANCEF) 1,000 mg in dextrose 5 % 50 mL IVPB (mini-bag)  1,000 mg IntraVENous Q8H Sarthak Rodriguez III, MD        oxyCODONE-acetaminophen (PERCOCET) 5-325 MG per tablet 1 tablet  1 tablet Oral Q4H PRN Sarthak Rodriguez III, MD        morphine injection 2 mg  2 mg IntraVENous Q4H PRN Sarthak Rodriguez III, MD        prochlorperazine (COMPAZINE) injection 10 mg  10 mg IntraVENous Q6H PRN May Yoon MD   10 mg at 22 1950    0.9 % sodium chloride infusion   IntraVENous PRN Patricio Velasquez MD        0.9 % sodium chloride bolus  1,000 mL IntraVENous Once May Yoon MD           Allergies: Allergies   Allergen Reactions    Penicillins Hives and Rash       Problem List:    Patient Active Problem List   Diagnosis Code    Absence of both breasts Z90.13    Status post breast reconstruction, unspecified laterality Z98.890       Past Medical History:        Diagnosis Date    Asthma     Cancer ([facility] [facility]     RNEAY BREAST    History of radiation therapy        Past Surgical History:        Procedure Laterality Date    ABDOMEN SURGERY      \"tummy tuck\"     SECTION      MASTECTOMY, BILATERAL         Social History:    Social History     Tobacco Use    Smoking status: Never Smoker    Smokeless tobacco: Never Used   Substance Use Topics    Alcohol use:  Yes                                Counseling given: Not Answered      Vital Signs (Current):   Vitals:    22 2105 22 2107 22   BP: (!) 83/54 (!) 82/54 100/63 (!) 94/59   Pulse:    99   Resp:       Temp:       TempSrc:       SpO2:       Weight:       Height:                                                  BP Readings from Last 3 Encounters:   22 (!) 94/59       NPO Status: Time of last liquid consumption: 1800                        Time of last solid consumption: 1800                        Date of last patient.         Attending anesthesiologist reviewed and agrees with Jaelyn Cyr MD   6/9/2022

## 2022-06-10 NOTE — ANESTHESIA POSTPROCEDURE EVALUATION
Department of Anesthesiology  Postprocedure Note    Patient: Mariah Maya  MRN: [id number]  YOB: 1975  Date of evaluation: 6/9/2022  Time:  11:18 PM     Procedure Summary     Date: 06/09/22 Room / Location: [facility] [address] [facility] / [facility]    Anesthesia Start: 2151 Anesthesia Stop: 2317    Procedure: EXPLORATION OF RIGHT BACK WOUND WITH EVACUATION OF HEMATOMA (Right ) Diagnosis:       Hematoma      (BACK WOUND HEMATOMA)    Surgeons: Yael Fitch MD Responsible Provider: Brenda Neri MD    Anesthesia Type: general ASA Status: 3          Anesthesia Type: No value filed. Darron Phase I: Darron Score: 9    Darron Phase II:      Last vitals: Reviewed and per EMR flowsheets.        Anesthesia Post Evaluation    Patient location during evaluation: PACU  Patient participation: complete - patient participated  Level of consciousness: awake and alert  Pain score: 0  Airway patency: patent  Nausea & Vomiting: no nausea and no vomiting  Complications: no  Cardiovascular status: hemodynamically stable  Respiratory status: acceptable  Hydration status: euvolemic

## 2022-06-10 NOTE — PROGRESS NOTES
Patient arrived to PACU post EXPLORATION OF RIGHT BACK WOUND WITH EVACUATION OF HEMATOMA - Right with Dr. Raul Tillye. VSS on arrival. Dr. TOM and OR RN gave PACU RN report at bedside. Dressing to surgical sites dry and intact with small drainage noted. 2 NIKKO drains and pain ball in place. Patient shows no signs of pain at this time. Will continue to monitor.

## 2022-06-10 NOTE — PROGRESS NOTES
Patient BP 70-80/40-60s. MD notified. Bolus started. Pt NIKKO with high output.  Will continue to monitor

## 2022-06-10 NOTE — PROGRESS NOTES
PACU Transfer to Floor Note    Procedure(s):  EXPLORATION OF RIGHT BACK WOUND WITH EVACUATION OF HEMATOMA    Current Allergies: Penicillins    Pt meets criteria as per Luis M Score and ASPAN Standards to transfer to next phase of care. No results for input(s): POCGLU in the last 72 hours. Vitals:    06/10/22 0000   BP: 114/67   Pulse: 98   Resp: 15   Temp:    SpO2: 98%     Vitals within 20% of pt's admission vitals as per LUIS M SCORE    SpO2: 98 %    O2 Flow Rate (L/min): 2 L/min      Intake/Output Summary (Last 24 hours) at 6/10/2022 0015  Last data filed at 6/9/2022 2359  Gross per 24 hour   Intake 3415.21 ml   Output 605 ml   Net 2810.21 ml       Pain assessment:  none    Pain Level: 0    Patient was assessed for alterations to skin integrity. There were not alterations observed. Is patient incontinent: no    Patient has all belongings at discharge from PACU. PACU RN called and updated patient's . Handoff report called and given at bedside.    Family updated and directed to pt room 5307.      6/10/2022 12:15 AM

## 2022-06-10 NOTE — PROGRESS NOTES
Pt back from OR. Pt is alert and oriented. VSS. Denies pain and or nausea. Call light within reach.  Will continue to monitor

## 2022-06-10 NOTE — PROGRESS NOTES
Awake, alert and oriented. Complains of pain on the right side. Dressings with no active drainage. Skin paddle of flap pink with good cap refill. Hct 23.8 this am. Pt would rather not have transfusion if possible. Will check counts in the morning and if has dropped again she may do better with transfusion. Will assess her condition tomorrow for possible discharge.

## 2022-06-10 NOTE — PROGRESS NOTES
Pt has been A&Ox4, VSS aside from slight tachycardia and borderline low BP, but asymptomatic and BP improved throughout shift. Surgical sites have no S&S of any further bleeding aside from some slight swelling along back just below the surgical site, there is no sign of hematoma, site is not any warmer than any other part of her back, will continue to monitor site. NIKKO drains continuing to check and empty as needed. Pt tolerating diet and starting to pass gas but dose report some bloating. Will continue to monitor.

## 2022-06-10 NOTE — CARE COORDINATION
CM following, pt from home ind with , plans to DC home no needs anticipated. POD#0 hematoma evac. Pending stable tissue perfusion, H&H, pain control. Plans to DC home no needs with family support.    Electronically signed by Robert Mart RN on 6/10/2022 at 12:55 PM   [phone]

## 2022-06-10 NOTE — BRIEF OP NOTE
Brief Postoperative Note      Patient: Donaldo Coyne  YOB: 1975  MRN: [id number]    Date of Procedure: 6/9/2022    Pre-Op Diagnosis: BACK WOUND HEMATOMA    Post-Op Diagnosis: Same       Procedure(s):  EXPLORATION OF RIGHT BACK WOUND WITH EVACUATION OF HEMATOMA    Surgeon(s):  Abelardo Mera MD    Assistant:  Surgical Assistant: Parveen Coyne    Anesthesia: General    Estimated Blood Loss (mL): 300 mL of hematoma and 50 mL of intraoperative blood loss    Complications: None    Specimens:   * No specimens in log *    Implants:  * No implants in log *      Drains:   Closed/Suction Drain Right Back Bulb (Active)   Site Description Clean, dry & intact 06/09/22 1800   Dressing Status Clean, dry & intact 06/09/22 1800   Drainage Appearance Bloody 06/09/22 1800   Drain Status To bulb suction 06/09/22 1800   Output (ml) 30 ml 06/09/22 2104       Closed/Suction Drain Lateral RUQ Bulb (Active)   Site Description Clean, dry & intact 06/09/22 1800   Dressing Status Clean, dry & intact 06/09/22 1800   Drainage Appearance Bloody 06/09/22 1800   Drain Status To bulb suction 06/09/22 1800   Output (ml) 60 ml 06/09/22 2104       [REMOVED] Closed/Suction Drain Lateral RUQ (Removed)   Site Description Clean, dry & intact 06/09/22 1800   Dressing Status Clean, dry & intact 06/09/22 1800   Drainage Appearance Bloody 06/09/22 1800   Drain Status To bulb suction 06/09/22 1800       [REMOVED] Urinary Catheter 2 Way (Removed)   Output (mL) 150 mL 06/09/22 1800       Findings: see above    Electronically signed by Abelardo Mera MD on 6/9/2022 at 11:14 PM

## 2022-06-10 NOTE — PROGRESS NOTES
Spoke with PACU nurse earlier today and the patient was doing well. Transferred to the floor in stable condition . Was called by patient's nurse on [address]. Swelling, dizziness and active bleeding all noted. On exam she has swelling of the lateral aspect if the right side of the chest wall and active bleeding from the back suture line. Blood pressure lower than immediately post op. Needs to be explored in OR under general anesthesia. I spoke with the patient and her  about my recommendation and he will give consent for the procedure.

## 2022-06-10 NOTE — OP NOTE
[address]                [address]                                OPERATIVE REPORT    PATIENT NAME: Karolyn Sood                   :        1975  MED REC NO:   [id number]                          ROOM:       5511  ACCOUNT NO:   [de-identified]                           ADMIT DATE: 2022  PROVIDER:     Ayse Mata. Amparo Reid MD    DATE OF PROCEDURE:  2022    PREOPERATIVE DIAGNOSES:  1. Personal history of breast cancer. 2.  Status post bilateral mastectomies. 3.  Status post bilateral first stage breast reconstruction. 4.  Status post removal of right-sided tissue expander due to  wound-healing problems. 5.  Possible impending tissue breakdown of reconstructed left breast due  to position of tissue expander. POSTOPERATIVE DIAGNOSES:  1. Personal history of breast cancer. 2.  Status post bilateral mastectomies. 3.  Status post bilateral first stage breast reconstruction. 4.  Status post removal of right-sided tissue expander due to  wound-healing problems. 5.  Possible impending tissue breakdown of reconstructed left breast due  to position of tissue expander. PROCEDURES:  1. Right latissimus dorsi myocutaneous flap, right breast  reconstruction. 2.  Tissue expander placement, reconstructed right breast.  3.  Re-position of tissue expander, reconstructed left breast.    SURGEON:  Yared Lucas MD    ANESTHESIA:  General.    ESTIMATED BLOOD LOSS:  Less than 100 mL. EXPANDERS USED:  1. [place] smooth, high profile 475 mL tissue expander filled  with 100 mL normal saline. 2.  Adjustment to left tissue expander:  50 mL removed from left tissue  expander. DRAINS:  15-Swiss NIKKO x2. DISPOSITION:  PACU. HISTORY:  This is a 78-year-old female who had to have the right-sided  tissue expander removed due to scarring and previous treatments to the  right side of the chest wall.   I believe she needed well-vascularized  muscle and skin to replace the poor quality skin. This was planned as  the LD flap. When marking the patient, she brought my attention to her  left side of the chest wall. She has a tissue expander in that side. On the lateral aspect, there was a corner of the tissue expander pushing  hard against the skin. This was causing the patient the pain and could  possibly lead to skin breakdown. My plan was to either add additional  fluid in hopes of unfurling the corner of the partially filled expander  or removing the tissue expander. During the course of the procedure,  neither of these maneuvers helped the situation. This was dealt with a  slight re-positioning which will be described below. The patient understands the risks of the procedures listed above. She  had all of her questions answered. Informed consent was obtained. DETAILS OF PROCEDURE:  The patient was marked in the preoperative  holding area and taken to the operating room. She was placed supine on  the operating room table and given general anesthesia. An airway was  placed. She was placed in the left lateral decubitus position. A  beanbag was used. An axillary roll was used. Other pressure points  were appropriately padded. She was then prepped and draped in sterile  fashion. The poor quality skin on the right side of the chest wall was excised  and the dissection was carried down to the fascial level. The pocket  was opened in accordance to the previously made markings. The tunnel  was begun posteriorly. Attention was turned to the right side of the back where the LD skin  island was incised. Dissection was carried down to the muscle. The  muscle was freed from the overlying skin and subcutaneous tissue  circumferentially. The muscle was then divided and  from the  serratus muscle. The pedicle was preserved.   Once the muscle was fully  freed, it was passed atraumatically through the tunnel. Antibiotic  irrigation was used to close. Suction drain was placed. An On-Q pain  relief catheter was placed. The back wound was closed with 2-0 Vicryl,  3-0 Vicryl, and 4-0 Monocryl. It was covered with Steri-Strips, dry  sterile dressings, and Tegaderm. The patient was placed in the supine position and both arms were placed  outstretched onto an armboard. She was prepped and draped in a sterile  fashion. Attention was paid to the left side where addition followed by  removal of fluid of the expander did not help the corner pushing out  laterally. Therefore, the lateral aspect of the scar was extended and  the capsule was released laterally to allow the expander to sit in which  should be a more comfortable position for the patient. 3-0 Vicryl and  3-0 nylon were used to close the wound. It was covered with dry sterile  dressings, tape, and Tegaderm. The tissue expander for the right side was brought up onto the field,  evacuated of air, and partially filled with saline. It was soaked in an  antibiotic irrigation and then placed and oriented appropriately in the  pocket. Its orientation was held using 2-0 Prolene sutures utilizing  the suture tabs incorporated into the expander. The latissimus muscle  was inset circumferentially around the expander and then, the skin  paddle was inset with 3-0 Vicryl and 3-0 nylon. Xeroform gauze was  placed over the suture line, covered with dry sterile dressings and  Tegaderm. The patient was placed in a supportive bra and then taken to  the recovery room in stable condition. Elizabeth Mcclellan MD    D: 06/09/2022 17:18:54       T: 06/09/2022 19:11:28     /[email or web address]  Job#: [id number]     Doc#: [id number]    CC:  Yared Rowley MD

## 2022-06-10 NOTE — PROGRESS NOTES
This RN went to assess pt. This RN noticed that there were some blood on gown. I looked at patient back side and dressing was saturated with blood. Pt BP was 71/48. Dr. Anita Read notified and Dr. Nikia Rodriguez were both called. Bolus started, labs ordered. Pt  updated. Pt also consented to blood transfusion if needed.

## 2022-06-10 NOTE — OP NOTE
[facility] [facility]                [address]                                OPERATIVE REPORT    PATIENT NAME: Karolyn Sood                   :        1975  MED REC NO:   [id number]                          ROOM:       1463  ACCOUNT NO:   [de-identified]                           ADMIT DATE: 2022  PROVIDER:     Ayse Mata. Amparo Reid MD    DATE OF PROCEDURE:  2022    PREOPERATIVE DIAGNOSES:  1.  Status post right latissimus dorsi right breast reconstruction. 2.  Hematoma of right side of back and right chest wall. POSTOPERATIVE DIAGNOSES:  1.  Status post right latissimus dorsi right breast reconstruction. 2.  Hematoma of right-sided back and right chest wall. OPERATION PERFORMED:  Exploration of right-sided back wound with  evacuation of the hematoma. SURGEON:  Yared Walls III, MD    ANESTHESIA:  General.    ESTIMATED BLOOD LOSS:  1.  300 mL of hematoma. 2.  50 mL of intraoperative blood loss. SPECIMENS:  None. DISPOSITION:  PACU. HISTORY:  This is a 73-year-old female who earlier today underwent right  latissimus dorsi flap right breast reconstruction with placement of  tissue expander. She was doing well in the postoperative care unit and  after she had been transferred to the floor, developed hypotension with  swelling of the lateral aspect of the right side of the chest wall with  active bleeding from the back wound. I evaluated her after being called  by the floor nurse and the decision was made to take the patient to the  operating room. DETAILS OF THE PROCEDURE:  The patient was brought into the operating  room. She was placed supine on the operating room table. She was given  general anesthetic. An airway was placed. She was then positioned in  left lateral decubitus position and then prepped and draped in sterile  fashion.   An axillary roll was used and other pressure points were  appropriately padded. After she had been prepped and draped in sterile  fashion, the back wound was reopened and approximately 300 mL of clot of  hematoma was found, mostly centered in the mid portion between the back  and the front of the chest wall. Copious irrigation was used and the  hematoma was evacuated. There was an arterial bleeder in the area of  most of the blood, which was cauterized and controlled with the cautery. Further irrigation was used and the few muscle bleeders were also  cauterized. Thorough inspection of the areas were carried out and there  appeared to be no active bleeding. Further irrigation was performed. The closed suction drains were placed properly. The back wound was  closed with 2-0 Vicryl, 3-0 Vicryl, and staples. Dry sterile dressings  and Tegaderm were placed over the suture line. The front suture line  was also dressed sterilely with a Xeroform gauze, dry sterile dressing,  and a Tegaderm. The patient left the operating room in stable condition and was taken to  recovery room. Yunior Alvarenga MD    D: 06/09/2022 23:29:06       T: 06/10/2022 0:27:06     /[email or web address] [email or web address]  Job#: [id number]     Doc#: [id number]    CC:  Antwon Lovelace MD

## 2022-06-11 LAB
HCT VFR BLD CALC: 23.2 % (ref 36–48)
HEMOGLOBIN: 7.8 G/DL (ref 12–16)
MCH RBC QN AUTO: 27.8 PG (ref 26–34)
MCHC RBC AUTO-ENTMCNC: 33.8 G/DL (ref 31–36)
MCV RBC AUTO: 82.1 FL (ref 80–100)
PDW BLD-RTO: 15.2 % (ref 12.4–15.4)
PLATELET # BLD: 263 K/UL (ref 135–450)
PMV BLD AUTO: 6.9 FL (ref 5–10.5)
RBC # BLD: 2.82 M/UL (ref 4–5.2)
WBC # BLD: 8.1 K/UL (ref 4–11)

## 2022-06-11 PROCEDURE — 1200000000 HC SEMI PRIVATE

## 2022-06-11 PROCEDURE — 2580000003 HC RX 258: Performed by: PLASTIC SURGERY

## 2022-06-11 PROCEDURE — 85027 COMPLETE CBC AUTOMATED: CPT

## 2022-06-11 PROCEDURE — 6370000000 HC RX 637 (ALT 250 FOR IP): Performed by: PLASTIC SURGERY

## 2022-06-11 PROCEDURE — 36415 COLL VENOUS BLD VENIPUNCTURE: CPT

## 2022-06-11 PROCEDURE — 6360000002 HC RX W HCPCS: Performed by: PLASTIC SURGERY

## 2022-06-11 RX ORDER — SENNA PLUS 8.6 MG/1
1 TABLET ORAL 2 TIMES DAILY
Status: DISCONTINUED | OUTPATIENT
Start: 2022-06-11 | End: 2022-06-12 | Stop reason: HOSPADM

## 2022-06-11 RX ORDER — DOCUSATE SODIUM 100 MG/1
100 CAPSULE, LIQUID FILLED ORAL 2 TIMES DAILY
Status: DISCONTINUED | OUTPATIENT
Start: 2022-06-11 | End: 2022-06-12 | Stop reason: HOSPADM

## 2022-06-11 RX ORDER — FERROUS SULFATE 325(65) MG
325 TABLET ORAL 2 TIMES DAILY WITH MEALS
Status: DISCONTINUED | OUTPATIENT
Start: 2022-06-11 | End: 2022-06-12 | Stop reason: HOSPADM

## 2022-06-11 RX ADMIN — OXYCODONE 5 MG: 5 TABLET ORAL at 16:40

## 2022-06-11 RX ADMIN — ANASTROZOLE 1 MG: 1 TABLET, COATED ORAL at 07:56

## 2022-06-11 RX ADMIN — POTASSIUM CHLORIDE AND SODIUM CHLORIDE: 900; 150 INJECTION, SOLUTION INTRAVENOUS at 19:21

## 2022-06-11 RX ADMIN — POTASSIUM CHLORIDE AND SODIUM CHLORIDE: 900; 150 INJECTION, SOLUTION INTRAVENOUS at 01:02

## 2022-06-11 RX ADMIN — OXYCODONE 5 MG: 5 TABLET ORAL at 07:55

## 2022-06-11 RX ADMIN — SENNOSIDES 8.6 MG: 8.6 TABLET, COATED ORAL at 10:42

## 2022-06-11 RX ADMIN — POTASSIUM CHLORIDE AND SODIUM CHLORIDE: 900; 150 INJECTION, SOLUTION INTRAVENOUS at 10:46

## 2022-06-11 RX ADMIN — FERROUS SULFATE TAB 325 MG (65 MG ELEMENTAL FE) 325 MG: 325 (65 FE) TAB at 10:42

## 2022-06-11 RX ADMIN — SENNOSIDES 8.6 MG: 8.6 TABLET, COATED ORAL at 21:09

## 2022-06-11 RX ADMIN — DOCUSATE SODIUM 100 MG: 100 CAPSULE, LIQUID FILLED ORAL at 10:42

## 2022-06-11 RX ADMIN — SODIUM CHLORIDE, PRESERVATIVE FREE 10 ML: 5 INJECTION INTRAVENOUS at 21:10

## 2022-06-11 RX ADMIN — DOCUSATE SODIUM 100 MG: 100 CAPSULE, LIQUID FILLED ORAL at 21:09

## 2022-06-11 RX ADMIN — FERROUS SULFATE TAB 325 MG (65 MG ELEMENTAL FE) 325 MG: 325 (65 FE) TAB at 16:40

## 2022-06-11 ASSESSMENT — PAIN DESCRIPTION - DESCRIPTORS: DESCRIPTORS: DULL;SORE

## 2022-06-11 ASSESSMENT — PAIN SCALES - GENERAL
PAINLEVEL_OUTOF10: 6
PAINLEVEL_OUTOF10: 5
PAINLEVEL_OUTOF10: 4
PAINLEVEL_OUTOF10: 3

## 2022-06-11 ASSESSMENT — PAIN DESCRIPTION - PAIN TYPE: TYPE: SURGICAL PAIN

## 2022-06-11 ASSESSMENT — PAIN DESCRIPTION - ORIENTATION: ORIENTATION: RIGHT

## 2022-06-11 ASSESSMENT — PAIN DESCRIPTION - LOCATION: LOCATION: BREAST

## 2022-06-11 NOTE — PROGRESS NOTES
Pt alert and oriented. VSS. Pt voiding freely. Pt surgical site intact with no signs of bleeding. Pt JPs with moderate output, see flowsheet. Pt reporting pain at beginning of shift, PRN Roxicodone given with benefit, see MAR. Pt tolerating diet without issue. Pt has IVF infusing per orders. Pt resting comfortably in bed. Pt has call light within reach, bed in lowest position with wheels locked, 2/4 side rails up, and bed alarm on. Will continue to monitor.

## 2022-06-11 NOTE — PROGRESS NOTES
AAO x 3. Pain lessening. P = 100; BP stable. Flap well perfused. Patient has not yet ambulated in halls and po intake needs to improve. Hope she is ready for discharge tomorrow.

## 2022-06-11 NOTE — PROGRESS NOTES
Pt remains A&Ox4 and VSS. BP stable. Surgical sites clean, dry, intact with NIKKO drains in place. Pt OOB and ambulating in halls. Voiding freely and tolerating diet. PRN oxycodone given per orders during shift. Call light within reach.  Will continue to monitor

## 2022-06-12 VITALS
RESPIRATION RATE: 18 BRPM | WEIGHT: 143.2 LBS | BODY MASS INDEX: 25.37 KG/M2 | TEMPERATURE: 98.2 F | SYSTOLIC BLOOD PRESSURE: 146 MMHG | HEART RATE: 103 BPM | OXYGEN SATURATION: 99 % | DIASTOLIC BLOOD PRESSURE: 92 MMHG | HEIGHT: 63 IN

## 2022-06-12 LAB
HCT VFR BLD CALC: 22 % (ref 36–48)
HEMOGLOBIN: 7.6 G/DL (ref 12–16)
MCH RBC QN AUTO: 27.9 PG (ref 26–34)
MCHC RBC AUTO-ENTMCNC: 34.6 G/DL (ref 31–36)
MCV RBC AUTO: 80.8 FL (ref 80–100)
PDW BLD-RTO: 14.9 % (ref 12.4–15.4)
PLATELET # BLD: 270 K/UL (ref 135–450)
PMV BLD AUTO: 7.1 FL (ref 5–10.5)
RBC # BLD: 2.72 M/UL (ref 4–5.2)
WBC # BLD: 6.9 K/UL (ref 4–11)

## 2022-06-12 PROCEDURE — 85027 COMPLETE CBC AUTOMATED: CPT

## 2022-06-12 PROCEDURE — 6370000000 HC RX 637 (ALT 250 FOR IP): Performed by: PLASTIC SURGERY

## 2022-06-12 PROCEDURE — 36415 COLL VENOUS BLD VENIPUNCTURE: CPT

## 2022-06-12 PROCEDURE — 6360000002 HC RX W HCPCS: Performed by: PLASTIC SURGERY

## 2022-06-12 RX ADMIN — SENNOSIDES 8.6 MG: 8.6 TABLET, COATED ORAL at 08:22

## 2022-06-12 RX ADMIN — POTASSIUM CHLORIDE AND SODIUM CHLORIDE: 900; 150 INJECTION, SOLUTION INTRAVENOUS at 05:14

## 2022-06-12 RX ADMIN — BENZOCAINE 1 LOZENGE: 3.6; 15 LOZENGE ORAL at 04:46

## 2022-06-12 RX ADMIN — DOCUSATE SODIUM 100 MG: 100 CAPSULE, LIQUID FILLED ORAL at 08:22

## 2022-06-12 RX ADMIN — ANASTROZOLE 1 MG: 1 TABLET, COATED ORAL at 08:23

## 2022-06-12 RX ADMIN — FERROUS SULFATE TAB 325 MG (65 MG ELEMENTAL FE) 325 MG: 325 (65 FE) TAB at 08:22

## 2022-06-12 ASSESSMENT — PAIN DESCRIPTION - LOCATION: LOCATION: BREAST

## 2022-06-12 ASSESSMENT — PAIN SCALES - GENERAL: PAINLEVEL_OUTOF10: 4

## 2022-06-12 ASSESSMENT — PAIN DESCRIPTION - ORIENTATION: ORIENTATION: RIGHT

## 2022-06-12 NOTE — PROGRESS NOTES
Received phone call from Dr. Janet Koo that patient is OK to d/c. Dr. Janet Koo aware of Hgb of 7.6.  Verbal with read-back discharge order placed, Pt aware

## 2022-06-12 NOTE — PROGRESS NOTES
Discharge order acknowledged and IV removed. Pt received discharge education and instructions at bedside, all questions answered.  Pt escorted off unit by wheelchair safely and discharged around 12:15 PM    Electronically signed by Stacey Browne RN on 6/12/2022 at 12:15 PM

## 2022-06-12 NOTE — PROGRESS NOTES
Pt A/O x4, VSS. Pt c/o 4/10 pain during shift, Pt reports pain level tolerable and not asking for pain medications. Surgical sites clean, dry, and intact w/ no drainage. NIKKO drains remain in place (see flowsheets for outputs). Pt independent in room w/ steady gait, OOB as tolerated. Pt able void freely, no BMs during shift. Pt has no other needs at this time. Call light and bedside table within reach.

## 2022-06-12 NOTE — CARE COORDINATION
Case Management Assessment            Discharge Note                    Date / Time of Note: 6/12/2022 10:08 AM                  Discharge Note Completed by: KENDALL Tinoco    Patient Name: Joya Beltre   YOB: 1975  Diagnosis: Malignant neoplasm of right female breast, unspecified estrogen receptor status, unspecified site of breast [facility] [C50.911]  Status post breast reconstruction, unspecified laterality [Z98.890]  Hematoma of right breast [N64.89]   Date / Time: 6/9/2022 11:14 AM    Current PCP: Sam Galeas MD  Clinic patient: No    Hospitalization in the last 30 days: No    Advance Directives:  Code Status: Full Code  [place] DNR form completed and on chart: Not Indicated    Financial:  Payor: Pari Crandall / Plan: Efrain Menon PPO / Product Type: *No Product type* /      Pharmacy:    [facility] [id number] - [address]  [facility] [address] [address]  Phone: [phone] Fax: [phone]      Assistance purchasing medications?:    Assistance provided by Case Management: None at this time    Does patient want to participate in local refill/ meds to beds program?:      Meds To Beds General Rules:  1. Can ONLY be done Monday- Friday between 8:30am-5pm  2. Prescription(s) must be in pharmacy by 3pm to be filled same day  3. Copy of patient's insurance/ prescription drug card and patient face sheet must be sent along with the prescription(s)  4. Cost of Rx cannot be added to hospital bill. If financial assistance is needed, please contact unit  or ;  or  CANNOT provide pharmacy voucher for patients co-pays  5.  Patients can then  the prescription on their way out of the hospital at discharge, or pharmacy can deliver to the bedside if staff is available. (payment due at time of pick-up or delivery - cash, check, or card accepted)     Able to afford home medications/ co-pay costs: Yes    ADLS:  Current PT AM-PAC Score:   /24  Current OT AM-PAC Score:   /24      DISCHARGE Disposition: Home- No Services Needed    LOC at discharge: Not Applicable  LUKAS Completed: Not Indicated    Notification completed in HENS/PAS?:  Not Applicable    IMM Completed:   Not Indicated    Transportation:  Transportation PLAN for discharge: family   Mode of Transport: Private Car  Reason for medical transport: Not Applicable  Name of Transport Company: Not Applicable  Time of Transport: n/a    Transport form completed: Not Indicated    Home Care:  [address] ordered at discharge: Not [address]: Not Applicable  Orders faxed: No    Durable Medical Equipment:  DME Provider: n/a  Equipment obtained during hospitalization: [address] and Respiratory Equipment:  Oxygen needed at discharge?: Not [address]: Not Applicable  Portable tank available for discharge?: Not Indicated    Dialysis:  Dialysis patient: No    Dialysis Center:  Not Applicable      Referrals made at [facility] for outpatient continued care:  Not Applicable    Additional CM Notes: Patient is from home independent with her . Review of the chart indicates no CM needs at discharge. CM spoke with nurse on duty today who verified the patient has no CM needs at discharge. Patient's  will provide transportation home.     The Plan for Transition of Care is related to the following treatment goals of Malignant neoplasm of right female breast, unspecified estrogen receptor status, unspecified site of breast ([facility] [C50.911]  Status post breast reconstruction, unspecified laterality [Z98.890]  Hematoma of right breast [N64.89]    The Patient and/or patient representative Bonita Johnston and her family were provided with a choice of provider and agrees with the discharge plan Not Indicated    Freedom of choice list was provided with basic dialogue that supports the patient's individualized plan of care/goals and shares the quality data associated with the providers.  Not Indicated    Care Transitions patient: KENDALL Rios  [facility] [place]  Case Management Department  Ph: [phone]  Fax: [phone]

## 2022-06-13 LAB
BLOOD BANK DISPENSE STATUS: NORMAL
BLOOD BANK DISPENSE STATUS: NORMAL
BLOOD BANK PRODUCT CODE: NORMAL
BLOOD BANK PRODUCT CODE: NORMAL
BPU ID: NORMAL
BPU ID: NORMAL
DESCRIPTION BLOOD BANK: NORMAL
DESCRIPTION BLOOD BANK: NORMAL

## 2022-06-15 LAB
HCT VFR BLD CALC: 27.4 % (ref 36–48)
HEMOGLOBIN: 9.4 G/DL (ref 12–16)
MCH RBC QN AUTO: 28 PG (ref 26–34)
MCHC RBC AUTO-ENTMCNC: 34.2 G/DL (ref 31–36)
MCV RBC AUTO: 82 FL (ref 80–100)
PDW BLD-RTO: 15.1 % (ref 12.4–15.4)
PLATELET # BLD: 476 K/UL (ref 135–450)
PMV BLD AUTO: 6.5 FL (ref 5–10.5)
RBC # BLD: 3.34 M/UL (ref 4–5.2)
WBC # BLD: 5.8 K/UL (ref 4–11)

## 2022-07-13 LAB
HCT VFR BLD CALC: 36.3 % (ref 36–48)
HEMOGLOBIN: 12 G/DL (ref 12–16)
MCH RBC QN AUTO: 29.1 PG (ref 26–34)
MCHC RBC AUTO-ENTMCNC: 33 G/DL (ref 31–36)
MCV RBC AUTO: 88.1 FL (ref 80–100)
PDW BLD-RTO: 17.7 % (ref 12.4–15.4)
PLATELET # BLD: 314 K/UL (ref 135–450)
PMV BLD AUTO: 6.8 FL (ref 5–10.5)
RBC # BLD: 4.12 M/UL (ref 4–5.2)
WBC # BLD: 5.4 K/UL (ref 4–11)

## 2022-07-21 NOTE — DISCHARGE SUMMARY
[address]               [address] [place], [address]                               DISCHARGE SUMMARY  PATIENT NAME: Lucinda Dc                   :        1975  MED REC NO:   [id number]                          ROOM:       3790  ACCOUNT NO:   [de-identified]                           ADMIT DATE: 2022  PROVIDER:     Vineet Prakash. Khushbu Lucero MD                  DISCHARGE DATE:  2022    LOCATION:  [address] [place] [address]. DIAGNOSES:  1. Personal history of breast cancer. 2.  Status post bilateral mastectomies. OPERATIVE PROCEDURES:  1. Right latissimus dorsi myocutaneous flap. 2.  Placement of tissue expander, reconstructive right breast, those  were on 2022.  3.  Evacuation of hematoma, right side chest wall that was on  2022. DISPOSITION:  Home. DISCHARGE MEDICATIONS:  Resume prior medications. HISTORY:  This is a 40-year-old female who underwent bilateral  mastectomies and bilateral first stage breast reconstruction. She  subsequently lost the tissue expander on the right side due to exposure. She has since healed. She returns for elective admission for  myocutaneous flap, reconstruction of right breast with tissue expander  placement. HOSPITAL COURSE:  The patient underwent the flap and tissue expander  placement and was doing well immediately postoperatively. Once she was  on the floor later that evening, she developed hypotension and  significant swelling of the operative site. She was taken back to the  operating room for evacuation of a hematoma. Her hemoglobin level  dropped, but did not require a transfusion. She spent the next several  days regaining her strength and slowly increasing her p.o. intake and  activity level. At the time of discharge, the flap appeared viable. The patient was  afebrile. She was tolerating a regular diet and ambulating with minimal  assistance.     She will follow up with me in several days.         Nadia Coto MD    D: 07/21/2022 10:51:23       T: 07/21/2022 11:19:41     BERNARDO/JOSE [email or web address]  Job#: [id number]     Doc#: [id number]    CC:

## 2023-12-12 NOTE — PROGRESS NOTES

## 2023-12-15 ENCOUNTER — ANESTHESIA (OUTPATIENT)
Dept: OPERATING ROOM | Age: 48
End: 2023-12-15
Payer: COMMERCIAL

## 2023-12-15 ENCOUNTER — HOSPITAL ENCOUNTER (OUTPATIENT)
Age: 48
Setting detail: OUTPATIENT SURGERY
Discharge: HOME | End: 2023-12-15
Attending: PLASTIC SURGERY | Admitting: PLASTIC SURGERY
Payer: COMMERCIAL

## 2023-12-15 ENCOUNTER — ANESTHESIA EVENT (OUTPATIENT)
Dept: OPERATING ROOM | Age: 48
End: 2023-12-15
Payer: COMMERCIAL

## 2023-12-15 VITALS
HEIGHT: 63 IN | TEMPERATURE: 97.6 F | HEART RATE: 95 BPM | RESPIRATION RATE: 16 BRPM | OXYGEN SATURATION: 97 % | WEIGHT: 143.2 LBS | SYSTOLIC BLOOD PRESSURE: 117 MMHG | DIASTOLIC BLOOD PRESSURE: 75 MMHG | BODY MASS INDEX: 25.37 KG/M2

## 2023-12-15 LAB — HCG UR QL: NEGATIVE

## 2023-12-15 PROCEDURE — 6360000002 HC RX W HCPCS: Performed by: ANESTHESIOLOGY

## 2023-12-15 PROCEDURE — 2500000003 HC RX 250 WO HCPCS: Performed by: NURSE ANESTHETIST, CERTIFIED REGISTERED

## 2023-12-15 PROCEDURE — 6360000002 HC RX W HCPCS: Performed by: PLASTIC SURGERY

## 2023-12-15 PROCEDURE — 6360000002 HC RX W HCPCS: Performed by: NURSE ANESTHETIST, CERTIFIED REGISTERED

## 2023-12-15 PROCEDURE — 6370000000 HC RX 637 (ALT 250 FOR IP): Performed by: ANESTHESIOLOGY

## 2023-12-15 PROCEDURE — 2580000003 HC RX 258: Performed by: ANESTHESIOLOGY

## 2023-12-15 PROCEDURE — 2500000003 HC RX 250 WO HCPCS: Performed by: PLASTIC SURGERY

## 2023-12-15 PROCEDURE — 2580000003 HC RX 258: Performed by: PLASTIC SURGERY

## 2023-12-15 PROCEDURE — 84703 CHORIONIC GONADOTROPIN ASSAY: CPT

## 2023-12-15 PROCEDURE — 2580000003 HC RX 258: Performed by: NURSE ANESTHETIST, CERTIFIED REGISTERED

## 2023-12-15 RX ORDER — MEPERIDINE HYDROCHLORIDE 25 MG/ML
12.5 INJECTION INTRAMUSCULAR; INTRAVENOUS; SUBCUTANEOUS EVERY 5 MIN PRN
Status: DISCONTINUED | OUTPATIENT
Start: 2023-12-15 | End: 2023-12-15 | Stop reason: HOSPADM

## 2023-12-15 RX ORDER — LORAZEPAM 2 MG/ML
0.5 INJECTION INTRAMUSCULAR
Status: DISCONTINUED | OUTPATIENT
Start: 2023-12-15 | End: 2023-12-15 | Stop reason: HOSPADM

## 2023-12-15 RX ORDER — ONDANSETRON 2 MG/ML
4 INJECTION INTRAMUSCULAR; INTRAVENOUS
Status: COMPLETED | OUTPATIENT
Start: 2023-12-15 | End: 2023-12-15

## 2023-12-15 RX ORDER — METHOCARBAMOL 100 MG/ML
INJECTION, SOLUTION INTRAMUSCULAR; INTRAVENOUS PRN
Status: DISCONTINUED | OUTPATIENT
Start: 2023-12-15 | End: 2023-12-15 | Stop reason: SDUPTHER

## 2023-12-15 RX ORDER — APREPITANT 40 MG/1
40 CAPSULE ORAL ONCE
Status: COMPLETED | OUTPATIENT
Start: 2023-12-15 | End: 2023-12-15

## 2023-12-15 RX ORDER — CLINDAMYCIN PHOSPHATE 900 MG/50ML
900 INJECTION INTRAVENOUS ONCE
Status: COMPLETED | OUTPATIENT
Start: 2023-12-15 | End: 2023-12-15

## 2023-12-15 RX ORDER — HYDRALAZINE HYDROCHLORIDE 20 MG/ML
INJECTION INTRAMUSCULAR; INTRAVENOUS PRN
Status: DISCONTINUED | OUTPATIENT
Start: 2023-12-15 | End: 2023-12-15 | Stop reason: SDUPTHER

## 2023-12-15 RX ORDER — IPRATROPIUM BROMIDE AND ALBUTEROL SULFATE 2.5; .5 MG/3ML; MG/3ML
1 SOLUTION RESPIRATORY (INHALATION)
Status: DISCONTINUED | OUTPATIENT
Start: 2023-12-15 | End: 2023-12-15 | Stop reason: HOSPADM

## 2023-12-15 RX ORDER — ACETAMINOPHEN 325 MG/1
650 TABLET ORAL
Status: DISCONTINUED | OUTPATIENT
Start: 2023-12-15 | End: 2023-12-15 | Stop reason: HOSPADM

## 2023-12-15 RX ORDER — DIPHENHYDRAMINE HYDROCHLORIDE 50 MG/ML
12.5 INJECTION INTRAMUSCULAR; INTRAVENOUS
Status: DISCONTINUED | OUTPATIENT
Start: 2023-12-15 | End: 2023-12-15 | Stop reason: HOSPADM

## 2023-12-15 RX ORDER — SODIUM CHLORIDE 0.9 % (FLUSH) 0.9 %
5-40 SYRINGE (ML) INJECTION PRN
Status: DISCONTINUED | OUTPATIENT
Start: 2023-12-15 | End: 2023-12-15 | Stop reason: HOSPADM

## 2023-12-15 RX ORDER — SODIUM CHLORIDE 0.9 % (FLUSH) 0.9 %
5-40 SYRINGE (ML) INJECTION EVERY 12 HOURS SCHEDULED
Status: DISCONTINUED | OUTPATIENT
Start: 2023-12-15 | End: 2023-12-15 | Stop reason: HOSPADM

## 2023-12-15 RX ORDER — PROCHLORPERAZINE EDISYLATE 5 MG/ML
5 INJECTION INTRAMUSCULAR; INTRAVENOUS
Status: COMPLETED | OUTPATIENT
Start: 2023-12-15 | End: 2023-12-15

## 2023-12-15 RX ORDER — SCOLOPAMINE TRANSDERMAL SYSTEM 1 MG/1
1 PATCH, EXTENDED RELEASE TRANSDERMAL ONCE
Status: COMPLETED | OUTPATIENT
Start: 2023-12-15 | End: 2023-12-15

## 2023-12-15 RX ORDER — ROCURONIUM BROMIDE 10 MG/ML
INJECTION, SOLUTION INTRAVENOUS PRN
Status: DISCONTINUED | OUTPATIENT
Start: 2023-12-15 | End: 2023-12-15 | Stop reason: SDUPTHER

## 2023-12-15 RX ORDER — MIDAZOLAM HYDROCHLORIDE 1 MG/ML
INJECTION INTRAMUSCULAR; INTRAVENOUS PRN
Status: DISCONTINUED | OUTPATIENT
Start: 2023-12-15 | End: 2023-12-15 | Stop reason: SDUPTHER

## 2023-12-15 RX ORDER — HYDROMORPHONE HYDROCHLORIDE 2 MG/ML
INJECTION, SOLUTION INTRAMUSCULAR; INTRAVENOUS; SUBCUTANEOUS PRN
Status: DISCONTINUED | OUTPATIENT
Start: 2023-12-15 | End: 2023-12-15 | Stop reason: SDUPTHER

## 2023-12-15 RX ORDER — SODIUM CHLORIDE 9 MG/ML
INJECTION, SOLUTION INTRAVENOUS PRN
Status: DISCONTINUED | OUTPATIENT
Start: 2023-12-15 | End: 2023-12-15 | Stop reason: HOSPADM

## 2023-12-15 RX ORDER — SODIUM CHLORIDE, SODIUM LACTATE, POTASSIUM CHLORIDE, CALCIUM CHLORIDE 600; 310; 30; 20 MG/100ML; MG/100ML; MG/100ML; MG/100ML
INJECTION, SOLUTION INTRAVENOUS CONTINUOUS
Status: DISCONTINUED | OUTPATIENT
Start: 2023-12-15 | End: 2023-12-15 | Stop reason: HOSPADM

## 2023-12-15 RX ORDER — BUPIVACAINE HYDROCHLORIDE AND EPINEPHRINE 2.5; 5 MG/ML; UG/ML
INJECTION, SOLUTION EPIDURAL; INFILTRATION; INTRACAUDAL; PERINEURAL PRN
Status: DISCONTINUED | OUTPATIENT
Start: 2023-12-15 | End: 2023-12-15 | Stop reason: HOSPADM

## 2023-12-15 RX ORDER — FENTANYL CITRATE 50 UG/ML
25 INJECTION, SOLUTION INTRAMUSCULAR; INTRAVENOUS EVERY 5 MIN PRN
Status: DISCONTINUED | OUTPATIENT
Start: 2023-12-15 | End: 2023-12-15 | Stop reason: HOSPADM

## 2023-12-15 RX ORDER — PHENYLEPHRINE HYDROCHLORIDE 10 MG/ML
INJECTION INTRAVENOUS PRN
Status: DISCONTINUED | OUTPATIENT
Start: 2023-12-15 | End: 2023-12-15 | Stop reason: SDUPTHER

## 2023-12-15 RX ORDER — FENTANYL CITRATE 50 UG/ML
INJECTION, SOLUTION INTRAMUSCULAR; INTRAVENOUS PRN
Status: DISCONTINUED | OUTPATIENT
Start: 2023-12-15 | End: 2023-12-15 | Stop reason: SDUPTHER

## 2023-12-15 RX ORDER — ONDANSETRON 2 MG/ML
INJECTION INTRAMUSCULAR; INTRAVENOUS PRN
Status: DISCONTINUED | OUTPATIENT
Start: 2023-12-15 | End: 2023-12-15 | Stop reason: SDUPTHER

## 2023-12-15 RX ORDER — LIDOCAINE HYDROCHLORIDE 20 MG/ML
INJECTION, SOLUTION INTRAVENOUS PRN
Status: DISCONTINUED | OUTPATIENT
Start: 2023-12-15 | End: 2023-12-15 | Stop reason: SDUPTHER

## 2023-12-15 RX ORDER — LABETALOL HYDROCHLORIDE 5 MG/ML
10 INJECTION, SOLUTION INTRAVENOUS
Status: DISCONTINUED | OUTPATIENT
Start: 2023-12-15 | End: 2023-12-15 | Stop reason: HOSPADM

## 2023-12-15 RX ORDER — PROPOFOL 10 MG/ML
INJECTION, EMULSION INTRAVENOUS PRN
Status: DISCONTINUED | OUTPATIENT
Start: 2023-12-15 | End: 2023-12-15 | Stop reason: SDUPTHER

## 2023-12-15 RX ORDER — ESMOLOL HYDROCHLORIDE 10 MG/ML
INJECTION INTRAVENOUS PRN
Status: DISCONTINUED | OUTPATIENT
Start: 2023-12-15 | End: 2023-12-15 | Stop reason: SDUPTHER

## 2023-12-15 RX ORDER — HYDROMORPHONE HYDROCHLORIDE 1 MG/ML
0.5 INJECTION, SOLUTION INTRAMUSCULAR; INTRAVENOUS; SUBCUTANEOUS EVERY 5 MIN PRN
Status: DISCONTINUED | OUTPATIENT
Start: 2023-12-15 | End: 2023-12-15 | Stop reason: HOSPADM

## 2023-12-15 RX ADMIN — HYDROMORPHONE HYDROCHLORIDE 1 MG: 2 INJECTION, SOLUTION INTRAMUSCULAR; INTRAVENOUS; SUBCUTANEOUS at 14:06

## 2023-12-15 RX ADMIN — ONDANSETRON 4 MG: 2 INJECTION INTRAMUSCULAR; INTRAVENOUS at 13:44

## 2023-12-15 RX ADMIN — HYDRALAZINE HYDROCHLORIDE 10 MG: 20 INJECTION INTRAMUSCULAR; INTRAVENOUS at 12:13

## 2023-12-15 RX ADMIN — SCOPOLAMINE 1 PATCH: 1.5 PATCH, EXTENDED RELEASE TRANSDERMAL at 11:37

## 2023-12-15 RX ADMIN — FENTANYL CITRATE 100 MCG: 50 INJECTION, SOLUTION INTRAMUSCULAR; INTRAVENOUS at 11:42

## 2023-12-15 RX ADMIN — DEXMEDETOMIDINE HYDROCHLORIDE 10 MCG: 100 INJECTION, SOLUTION INTRAVENOUS at 13:59

## 2023-12-15 RX ADMIN — LIDOCAINE HYDROCHLORIDE 50 MG: 20 INJECTION, SOLUTION INTRAVENOUS at 11:47

## 2023-12-15 RX ADMIN — PROCHLORPERAZINE EDISYLATE 5 MG: 5 INJECTION INTRAMUSCULAR; INTRAVENOUS at 15:12

## 2023-12-15 RX ADMIN — HYDROMORPHONE HYDROCHLORIDE 1 MG: 2 INJECTION, SOLUTION INTRAMUSCULAR; INTRAVENOUS; SUBCUTANEOUS at 12:10

## 2023-12-15 RX ADMIN — ROCURONIUM BROMIDE 50 MG: 10 INJECTION, SOLUTION INTRAVENOUS at 11:46

## 2023-12-15 RX ADMIN — APREPITANT 40 MG: 40 CAPSULE ORAL at 11:36

## 2023-12-15 RX ADMIN — METHOCARBAMOL 500 MG: 100 INJECTION, SOLUTION INTRAMUSCULAR; INTRAVENOUS at 12:51

## 2023-12-15 RX ADMIN — SODIUM CHLORIDE, POTASSIUM CHLORIDE, SODIUM LACTATE AND CALCIUM CHLORIDE: 600; 310; 30; 20 INJECTION, SOLUTION INTRAVENOUS at 10:45

## 2023-12-15 RX ADMIN — MEPERIDINE HYDROCHLORIDE 12.5 MG: 25 INJECTION INTRAMUSCULAR; INTRAVENOUS; SUBCUTANEOUS at 16:30

## 2023-12-15 RX ADMIN — PROPOFOL 200 MG: 10 INJECTION, EMULSION INTRAVENOUS at 11:44

## 2023-12-15 RX ADMIN — ESMOLOL HYDROCHLORIDE 10 MG: 10 INJECTION, SOLUTION INTRAVENOUS at 14:03

## 2023-12-15 RX ADMIN — MIDAZOLAM HYDROCHLORIDE 2 MG: 2 INJECTION, SOLUTION INTRAMUSCULAR; INTRAVENOUS at 11:37

## 2023-12-15 RX ADMIN — ONDANSETRON 4 MG: 2 INJECTION INTRAMUSCULAR; INTRAVENOUS at 14:54

## 2023-12-15 RX ADMIN — PHENYLEPHRINE HYDROCHLORIDE 100 MCG: 10 INJECTION INTRAVENOUS at 13:08

## 2023-12-15 RX ADMIN — FENTANYL CITRATE 25 MCG: 50 INJECTION INTRAMUSCULAR; INTRAVENOUS at 14:40

## 2023-12-15 RX ADMIN — ROCURONIUM BROMIDE 20 MG: 10 INJECTION, SOLUTION INTRAVENOUS at 12:11

## 2023-12-15 RX ADMIN — SUGAMMADEX 350 MG: 100 INJECTION, SOLUTION INTRAVENOUS at 14:00

## 2023-12-15 RX ADMIN — CLINDAMYCIN PHOSPHATE 900 MG: 900 INJECTION, SOLUTION INTRAVENOUS at 11:59

## 2023-12-15 ASSESSMENT — PAIN DESCRIPTION - PAIN TYPE: TYPE: SURGICAL PAIN

## 2023-12-15 ASSESSMENT — PAIN DESCRIPTION - ORIENTATION: ORIENTATION: LEFT

## 2023-12-15 ASSESSMENT — PAIN DESCRIPTION - DESCRIPTORS: DESCRIPTORS: ACHING

## 2023-12-15 ASSESSMENT — PAIN DESCRIPTION - LOCATION: LOCATION: BREAST

## 2023-12-15 ASSESSMENT — PAIN SCALES - GENERAL
PAINLEVEL_OUTOF10: 4
PAINLEVEL_OUTOF10: 0

## 2023-12-15 ASSESSMENT — PAIN DESCRIPTION - FREQUENCY: FREQUENCY: CONTINUOUS

## 2023-12-15 ASSESSMENT — PAIN DESCRIPTION - ONSET: ONSET: ON-GOING

## 2023-12-15 NOTE — PROGRESS NOTES
Patient arrived to PACU post REMOVAL AND REPLACEMENT OF IMPLANT LEFT BREAST WITH CONTOURING OF THE CHEST WALL AND AXILLA WITH LIPOSUCTION, SCAR REVISION OF BACK AND LATERAL ASPECT OF RIGHT SIDE CHEST WALL, EXCISION AND INTERMEDIATE CLOSURE OF 16 CM SCAR - Left with Dr. Aida Welch. VSS on arrival. CRNA gave PACU RN report at bedside stating no complications during procedure. Patient c/o pain on arrival. CRNA gave patient medication at bedside. Dressing to surgical site clean, dry and intact. Will continue to monitor.

## 2023-12-15 NOTE — ANESTHESIA POSTPROCEDURE EVALUATION
Department of Anesthesiology  Postprocedure Note    Patient: Angelito Palm  MRN: [id number]  YOB: 1975  Date of evaluation: 12/15/2023    Procedure Summary       Date: 12/15/23 Room / Location: [facility] OR  / Health system    Anesthesia Start: 1660 Anesthesia Stop: 3825    Procedures:       REMOVAL AND REPLACEMENT OF IMPLANT LEFT BREAST WITH CONTOURING OF THE CHEST WALL AND AXILLA WITH LIPOSUCTION, SCAR REVISION OF BACK AND LATERAL ASPECT OF RIGHT SIDE CHEST WALL, EXCISION AND INTERMEDIATE CLOSURE OF 16 CM SCAR (Left)      . (Right) Diagnosis:       Personal history of malignant neoplasm of breast      (Personal history of malignant neoplasm of breast [Z85.3])    Surgeons: Holland Alcaraz MD Responsible Provider: Gretta Tyler MD    Anesthesia Type: general ASA Status: 2            Anesthesia Type: No value filed. Darron Phase I: Darron Score: 8    Darron Phase II:      Anesthesia Post Evaluation    Patient location during evaluation: PACU  Patient participation: complete - patient participated  Level of consciousness: awake and alert  Airway patency: patent  Nausea & Vomiting: no nausea and no vomiting  Cardiovascular status: hemodynamically stable  Respiratory status: acceptable  Hydration status: euvolemic  Multimodal analgesia pain management approach  Pain management: satisfactory to patient    No notable events documented.

## 2023-12-15 NOTE — PROGRESS NOTES
Pt alert and oriented x4. IV placed. IVF infusing. No No sleeve R arm. Denies questions at this time.

## 2023-12-15 NOTE — BRIEF OP NOTE
Brief Postoperative Note      Patient: Maddie York  YOB: 1975  MRN: [id number]    Date of Procedure: 12/15/2023    Pre-Op Diagnosis Codes:     * Personal history of malignant neoplasm of breast [Z85.3]    Post-Op Diagnosis: Same       Procedure(s):  REMOVAL AND REPLACEMENT OF IMPLANT LEFT BREAST WITH CONTOURING OF THE CHEST WALL AND AXILLA WITH LIPOSUCTION, SCAR REVISION OF BACK AND LATERAL ASPECT OF RIGHT SIDE CHEST WALL, EXCISION AND INTERMEDIATE CLOSURE OF 16 CM SCAR  .     Surgeon(s):  Marisel James MD    Assistant:  Surgical Assistant: Mitra Thurston    Anesthesia: General    Estimated Blood Loss (mL): less than 50     Complications: None    Specimens:   * No specimens in log *    Implants:  * No implants in log *      Drains: * No LDAs found *    Findings: n/a      Electronically signed by Marisel James MD on 12/15/2023 at 1:50 PM

## 2023-12-16 NOTE — OP NOTE
[address] [facility]               [facility] [address]                                OPERATIVE REPORT    PATIENT NAME: Elizabeth Martínez                   :        1975  MED REC NO:   [id number]                          ROOM:  ACCOUNT NO:   [de-identified]                           ADMIT DATE: 12/15/2023  PROVIDER:     Eleuterio Go. Jairon Cai MD    DATE OF PROCEDURE:  12/15/2023    PREOPERATIVE DIAGNOSES:  1. Personal history of breast cancer. 2.  Status post bilateral mastectomies. 3.  Status post bilateral breast reconstruction. 4.  Asymmetry of the reconstructed breasts. 5.  Excess skin and subcutaneous tissue of right side of back extending  to right lateral aspect of chest wall (as a result of latissimus dorsi  flap right breast reconstruction). POSTOPERATIVE DIAGNOSES:  1. Personal history of breast cancer. 2.  Status post bilateral mastectomies. 3.  Status post bilateral breast reconstruction. 4.  Asymmetry of the reconstructed breasts. 5.  Excess skin and subcutaneous tissue of right side of back extending  to right lateral aspect of chest wall (as a result of latissimus dorsi  flap right breast reconstruction). PROCEDURE:  1. Removal and placement of implant reconstructed, left breast.  2.  Excision of skin and subcutaneous tissue (20 cm) with intermediate  closure of 20 cm open wound right side of back extending the right side  of chest wall. 3.  Liposuction of left side chest wall and left axilla. [facility]  [facility]    ANESTHESIA:  General.    ESTIMATED BLOOD LOSS:  Less than 50 mL. IMPLANTS USED:  MENTOR Tall Height - High Profile, 430 mL MemoryShape  implant, (left breast). DISPOSITION:  PACU. HISTORY:  This is a 49-year-old female who has undergone bilateral  breast reconstruction. She had an LD flap on the right where the skin  was close following the mastectomy defect on the left.   This has  produced some tightness and flattening over the mid portion of the  implant producing more fullness superiorly. She also had lipodystrophy  of the lateral aspect of the chest wall on the left extending up to the  axilla. Plan today is to remove the round implant on the left and  replace it with an anatomic shaped implant with less projection in the  upper pole along with liposuction. In addition, she had excess skin and  subcutaneous tissue on the right side as a result of the LD flap, which  was visible through clothing above it. Risks and activity restrictions associated with these procedures were  explained to the patient. She understands she will be getting a  textured implant on the left and that there is a small risk of lymphoma  as a result of the texturing. I explained to her that she will never  get perfect symmetry, but I do think the efforts today are warranted. DESCRIPTION OF PROCEDURE:  The patient was marked in the sitting  position and taken to the operating room. She was placed supine on the  operating room table and given general anesthesia. An airway was  placed. She was then positioned in left lateral decubitus position and  the right side of the chest wall extended into the right breast and to  the mid portion of the back. She was prepped and draped in sterile  fashion. The excess skin was incised. Dissection was carried down to the muscle  layer with the cautery. The skin of the subcutaneous tissue was  excised. Hemostasis was maintained throughout using the cautery. Undermining was carried out superiorly and inferiorly. The deep layer  was closed and the dead space obliterated using interrupted 2-0 Vicryl  sutures. Interrupted 3-0 Vicryl sutures were used to re-approximate the  dermis and a 4-0 Monocryl subcuticular stitch was used to re-approximate  the epidermis. Steri-Strips, dry sterile dressings, and tape were  placed over the suture line.   The patient was

## 2024-05-01 NOTE — ANESTHESIA POSTPROCEDURE EVALUATION
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  Neuro exam improved with addition of Mil also higher MAPs~ 110   Remained on Boo 20 ppm   Pulm hypertension consulted to optimize pulm vasodilators considering double agent   - Continue Riociguat/ to start Macitetan considering component of PAH with CTEPH   - Weaning oBo in the next 24 hour/ monitoring PAPs   - Continue to diurese or Neg FB/ lytes supplemented   - ADAT/Glycemic control/ ICU ppx/ SCDs and IS   - Doppler with chronic nonconstructive left sided fem/pop DVTs / AC on hold in the presence of LD  Considering to remove drain in the next 24 hours and resuming anticoagulation as patient high risk for pulm decompensation -- to discuss with Dr. Doyle JIMENEZ and mobilizing with LC capped   Central line / PAC form 4/29- Helio stevens     ATTENDING: I have personally and independently provided 105 min of critical care services. This excludes any time spent on separate procedures or teaching.     Department of Anesthesiology  Postprocedure Note    Patient: Cary Cruz  MRN: [id number]  YOB: 1975  Date of evaluation: 6/9/2022  Time:  7:47 PM     Procedure Summary     Date: 06/09/22 Room / Location: [facility] [address]  / [facility]    Anesthesia Start: 9087 Anesthesia Stop: 3146    Procedures:       RIGHT BREAST RECONSTRUCTION WITH LATISSIMUS DORSI FLAP AND INSERTION OF TISSUE EXPANDER (Right Back)      . (Right Breast) Diagnosis:       Malignant neoplasm of right female breast, unspecified estrogen receptor status, unspecified site of breast ([facility] [address]      (Malignant neoplasm of right female breast, unspecified estrogen receptor status, unspecified site of breast ([facility] [address] [employer]oss)    Surgeons: Leighton Wihte MD Responsible Provider: Sylvia Carlson MD    Anesthesia Type: general ASA Status: 3          Anesthesia Type: No value filed. Darron Phase I: Darron Score: 10    Darron Phase II:      Last vitals: Reviewed and per EMR flowsheets.        Anesthesia Post Evaluation    Patient location during evaluation: PACU  Patient participation: complete - patient participated  Level of consciousness: awake and alert  Pain score: 3  Airway patency: patent  Nausea & Vomiting: no nausea and no vomiting  Complications: no  Cardiovascular status: hemodynamically stable  Respiratory status: acceptable  Hydration status: euvolemic

## (undated) DEVICE — SUTURE VCRL SZ 2-0 L18IN ABSRB VLT L26MM SH 1/2 CIR J775D

## (undated) DEVICE — STANDARD HYPODERMIC NEEDLE,POLYPROPYLENE HUB: Brand: MONOJECT

## (undated) DEVICE — DRAPE,T,LAPARO,TRANS,STERILE: Brand: MEDLINE

## (undated) DEVICE — SUTURE VCRL SZ 2-0 L27IN ABSRB UD L26MM SH 1/2 CIR J417H

## (undated) DEVICE — AEGIS 1" DISK 4MM HOLE, PEEL OPEN: Brand: MEDLINE

## (undated) DEVICE — STRIP SKIN CLSR W1XL5IN NYL REINF CURAD

## (undated) DEVICE — 3M™ TEGADERM™ TRANSPARENT FILM DRESSING FRAME STYLE, 1628, 6 IN X 8 IN (15 CM X 20 CM), 10/CT 8CT/CASE: Brand: 3M™ TEGADERM™

## (undated) DEVICE — BREAST TISSUE EXPANDER, SUTURE TABS, INTEGRAL INJECTION DOME, 475CC: Brand: MENTOR ARTOURA PLUS, SMOOTH, HIGH PROFILE

## (undated) DEVICE — E-Z CLEAN, NON-STICK, PTFE COATED, ELECTROSURGICAL BLADE ELECTRODE, 4 INCH (10.2 CM): Brand: MEGADYNE

## (undated) DEVICE — PENCIL ELECSURG HND CTRL ALL IN 1 MONOPOLAR LAP

## (undated) DEVICE — SUTURE VCRL SZ 3-0 L18IN ABSRB UD L26MM SH 1/2 CIR J864D

## (undated) DEVICE — STRIP,CLOSURE,WOUND,MEDI-STRIP,1/2X4: Brand: MEDLINE

## (undated) DEVICE — PROTECTOR ULN NRV PUR FOAM HK LOOP STRP ANATOMICALLY

## (undated) DEVICE — SHEET,DRAPE,53X77,STERILE: Brand: MEDLINE

## (undated) DEVICE — 3M™ STERI-STRIP™ COMPOUND BENZOIN TINCTURE 40 BAGS/CARTON 4 CARTONS/CASE C1544: Brand: 3M™ STERI-STRIP™

## (undated) DEVICE — INTENDED FOR TISSUE SEPARATION, AND OTHER PROCEDURES THAT REQUIRE A SHARP SURGICAL BLADE TO PUNCTURE OR CUT.: Brand: BARD-PARKER ® CARBON RIB-BACK BLADES

## (undated) DEVICE — DRESSING,GAUZE,XEROFORM,CURAD,1"X8",ST: Brand: CURAD

## (undated) DEVICE — KIT INFUS PMP 270ML 4ML/HR 2ML/SITE SOAK CATH L5IN N NARC

## (undated) DEVICE — SUTURE PERMA-HAND SZ 2-0 L30IN NONABSORBABLE BLK L30MM FSL 679H

## (undated) DEVICE — SYRINGE MED 50ML LUERLOCK TIP

## (undated) DEVICE — STERILE POLYISOPRENE POWDER-FREE SURGICAL GLOVES: Brand: PROTEXIS

## (undated) DEVICE — COVER,MAYO STAND,XL,STERILE: Brand: MEDLINE

## (undated) DEVICE — RESERVOIR,SUCTION,100CC,SILICONE: Brand: MEDLINE

## (undated) DEVICE — GENERAL: Brand: MEDLINE INDUSTRIES, INC.

## (undated) DEVICE — SPONGE GZ W4XL4IN COT 12 PLY TYP VII WVN C FLD DSGN

## (undated) DEVICE — 3M™ TEGADERM™ TRANSPARENT FILM DRESSING FRAME STYLE, 1626W, 4 IN X 4-3/4 IN (10 CM X 12 CM), 50/CT 4CT/CASE: Brand: 3M™ TEGADERM™

## (undated) DEVICE — PLASTIC MAJOR: Brand: MEDLINE INDUSTRIES, INC.

## (undated) DEVICE — SOLUTION IV 1000ML 0.9% SOD CHL

## (undated) DEVICE — LARGE BORE STOPCOCK WITH ROTATING MALE LUER LOCK

## (undated) DEVICE — 3M™ TEGADERM™ TRANSPARENT FILM DRESSING FRAME STYLE, 1626, 4 IN X 4-3/4 IN (10 CM X 12 CM), 50/CT 4CT/CASE: Brand: 3M™ TEGADERM™

## (undated) DEVICE — SUTURE MCRYL SZ 4-0 L27IN ABSRB UD L19MM PS-2 1/2 CIR PRIM Y426H

## (undated) DEVICE — STOCKINETTE ORTH W9XL36IN COT 2 PLY HLLW FOR HANDLING LMB

## (undated) DEVICE — 3M™ STERI-STRIP™ REINFORCED ADHESIVE SKIN CLOSURES, R1548, 1 IN X 5 IN (25 MM X 125 MM), 4 STRIPS/ENVELOPE: Brand: 3M™ STERI-STRIP™

## (undated) DEVICE — CHLORAPREP 26ML ORANGE

## (undated) DEVICE — 6 ML SYRINGE LUER-LOCK TIP: Brand: MONOJECT

## (undated) DEVICE — SUTURE ETHLN SZ 3-0 L30IN NONABSORBABLE BLK L36MM FSLX 3/8 1673BH

## (undated) DEVICE — TOWEL,OR,DSP,ST,BLUE,DLX,8/PK,10PK/CS: Brand: MEDLINE

## (undated) DEVICE — TOWEL,STOP FLAG GOLD N-W: Brand: MEDLINE

## (undated) DEVICE — APPLIER LIG CLP M L11IN TI STR RNG HNDL FOR 20 CLP DISP

## (undated) DEVICE — SPONGE GZ W4XL8IN COT WVN 12 PLY

## (undated) DEVICE — STERILE VELCLOSE ELASTIC BANDAGE, 6IN: Brand: VELCLOSE

## (undated) DEVICE — Device

## (undated) DEVICE — SUTURE PROL SZ 2-0 L36IN NONABSORBABLE BLU SH L26MM 1/2 CIR 8523H

## (undated) DEVICE — CLEANER,CAUTERY TIP,2X2",STERILE: Brand: MEDLINE

## (undated) DEVICE — DECANTER BAG 9": Brand: MEDLINE INDUSTRIES, INC.

## (undated) DEVICE — DRAIN,WOUND,15FR,3/16,FULL-FLUTED: Brand: MEDLINE